# Patient Record
Sex: FEMALE | Race: WHITE | NOT HISPANIC OR LATINO | Employment: FULL TIME | URBAN - METROPOLITAN AREA
[De-identification: names, ages, dates, MRNs, and addresses within clinical notes are randomized per-mention and may not be internally consistent; named-entity substitution may affect disease eponyms.]

---

## 2019-03-15 ENCOUNTER — ROUTINE PRENATAL (OUTPATIENT)
Dept: OBSTETRICS AND GYNECOLOGY | Facility: CLINIC | Age: 34
End: 2019-03-15
Payer: MEDICAID

## 2019-03-15 VITALS
DIASTOLIC BLOOD PRESSURE: 77 MMHG | SYSTOLIC BLOOD PRESSURE: 115 MMHG | HEART RATE: 83 BPM | HEIGHT: 63 IN | BODY MASS INDEX: 35.97 KG/M2 | WEIGHT: 203 LBS

## 2019-03-15 DIAGNOSIS — Z98.891 HISTORY OF CESAREAN SECTION: ICD-10-CM

## 2019-03-15 LAB
ABS NRBC COUNT: 0 X 10 3/UL (ref 0–0.01)
ABSOLUTE BASOPHIL: 0.04 X 10 3/UL (ref 0–0.22)
ABSOLUTE EOSINOPHIL: 0.03 X 10 3/UL (ref 0.04–0.54)
ABSOLUTE IMMATURE GRAN: 0.1 X 10 3/UL (ref 0–0.04)
ABSOLUTE LYMPHOCYTE: 1.51 X 10 3/UL (ref 0.86–4.75)
ABSOLUTE MONOCYTE: 0.55 X 10 3/UL (ref 0.22–1.08)
AMORPH URATE CRY URNS QL MICRO: NEGATIVE
ANTIBODY SCREEN: NEGATIVE
BACTERIA #/AREA URNS HPF: ABNORMAL /[HPF]
BASOPHILS NFR BLD: 0.4 %
BILIRUB UR QL STRIP: NEGATIVE
CLARITY UR: ABNORMAL
COLOR UR: YELLOW
EOSINOPHIL NFR BLD: 0.3 %
EPITHELIAL CELLS: ABNORMAL
GLUCOSE (UA): NEGATIVE MG/DL
GLUCOSE 1 HR POST 50 GM: 132 MG/DL
HCT VFR BLD AUTO: 36.7 % (ref 37–47)
HGB BLD-MCNC: 11.4 G/DL (ref 12–16)
IMMATURE GRANULOCYTES: 0.9 % (ref 0–0.5)
KETONES UR QL STRIP: NEGATIVE MG/DL
LEUKOCYTE ESTERASE UR QL STRIP: ABNORMAL
LYMPHOCYTES NFR BLD: 13.4 %
MCH RBC QN AUTO: 29.4 PG (ref 27–32)
MCHC RBC AUTO-ENTMCNC: 31.1 G/DL (ref 32–36)
MCV RBC AUTO: 94.6 FL (ref 82–100)
MONOCYTES NFR BLD: 4.9 %
MUCOUS THREADS URNS QL MICRO: ABNORMAL
NEUTROPHILS ABSOLUTE COUNT: 9.04 X 10 3/UL (ref 2.15–7.56)
NEUTROPHILS NFR BLD: 80.1 %
NITRITE UR QL STRIP: NEGATIVE
NUCLEATED RED BLOOD CELLS: 0 /100 WBC (ref 0–0.2)
OCCULT BLOOD: ABNORMAL
PH, URINE: 7 (ref 5–7.5)
PLATELET # BLD AUTO: 219 X 10 3/UL (ref 135–400)
PROT UR QL STRIP: NEGATIVE MG/DL
RBC # BLD AUTO: 3.88 X 10 6/UL (ref 4.2–5.4)
RBC/HPF: ABNORMAL
RDW-SD: 45.8 FL (ref 37–54)
SP GR UR STRIP: 1.01 (ref 1–1.03)
SYPHILIS TREPONEMAL ANTIBODY: NONREACTIVE
URINE CULTURE, ROUTINE: NORMAL
UROBILINOGEN, URINE: NORMAL E.U./DL (ref 0–1)
WBC # BLD: 11.27 X 10 3/UL (ref 4.3–10.8)
WBC/HPF: ABNORMAL

## 2019-03-15 PROCEDURE — 99213 OFFICE O/P EST LOW 20 MIN: CPT | Mod: TH,S$GLB,, | Performed by: OBSTETRICS & GYNECOLOGY

## 2019-03-15 PROCEDURE — 99213 PR OFFICE/OUTPT VISIT, EST, LEVL III, 20-29 MIN: ICD-10-PCS | Mod: TH,S$GLB,, | Performed by: OBSTETRICS & GYNECOLOGY

## 2019-03-15 NOTE — PROGRESS NOTES
Subjective:       Patient ID: Vashti Mccormack is a 33 y.o.  at 35w6d     Chief Complaint:  Routine Prenatal Visit      History of Present Illness  No complaints. Labs and history reviewed with pt.         Review of Systems  Denies n/v, f/c, dysuria, contractions,   VD, VB, round ligament pain, headaches, preE ROS       Objective:     Vitals:    03/15/19 1034   BP: 115/77   Pulse: 83     Wt Readings from Last 3 Encounters:   03/15/19 92.1 kg (203 lb)       nad  NCAT  pupils normal size  Skin nml no rashes or lesions  No resp distress, resp even and unlabored  Gravid nt, no rebound no guarding  No cyanosis or clubbing, edema appropriate for pregn  FH 36cm   FHT: 150s   CVX: cl/th/high     Assessment:        1. History of  section                Plan:         GBS today   Encouraged PNV  Pain, fever, bleeding precautions   RTC 1 weeks

## 2019-03-17 LAB
GROUP B STREP MOLECULAR: NEGATIVE
PENICILLIN ALLERGIC: NO

## 2019-03-21 ENCOUNTER — ROUTINE PRENATAL (OUTPATIENT)
Dept: OBSTETRICS AND GYNECOLOGY | Facility: CLINIC | Age: 34
End: 2019-03-21
Payer: MEDICAID

## 2019-03-21 VITALS
DIASTOLIC BLOOD PRESSURE: 73 MMHG | WEIGHT: 207 LBS | SYSTOLIC BLOOD PRESSURE: 111 MMHG | HEART RATE: 80 BPM | BODY MASS INDEX: 36.67 KG/M2

## 2019-03-21 DIAGNOSIS — Z98.891 HISTORY OF CESAREAN SECTION: ICD-10-CM

## 2019-03-21 LAB — PRENATAL STREP B CULTURE: NORMAL

## 2019-03-21 PROCEDURE — 99213 OFFICE O/P EST LOW 20 MIN: CPT | Mod: TH,S$GLB,, | Performed by: OBSTETRICS & GYNECOLOGY

## 2019-03-21 PROCEDURE — 99213 PR OFFICE/OUTPT VISIT, EST, LEVL III, 20-29 MIN: ICD-10-PCS | Mod: TH,S$GLB,, | Performed by: OBSTETRICS & GYNECOLOGY

## 2019-03-27 ENCOUNTER — ROUTINE PRENATAL (OUTPATIENT)
Dept: OBSTETRICS AND GYNECOLOGY | Facility: CLINIC | Age: 34
End: 2019-03-27
Payer: MEDICAID

## 2019-03-27 VITALS
BODY MASS INDEX: 36.49 KG/M2 | SYSTOLIC BLOOD PRESSURE: 109 MMHG | HEART RATE: 87 BPM | WEIGHT: 206 LBS | DIASTOLIC BLOOD PRESSURE: 74 MMHG

## 2019-03-27 DIAGNOSIS — Z98.891 HISTORY OF CESAREAN SECTION: ICD-10-CM

## 2019-03-27 PROCEDURE — 99213 OFFICE O/P EST LOW 20 MIN: CPT | Mod: TH,S$GLB,, | Performed by: OBSTETRICS & GYNECOLOGY

## 2019-03-27 PROCEDURE — 99213 PR OFFICE/OUTPT VISIT, EST, LEVL III, 20-29 MIN: ICD-10-PCS | Mod: TH,S$GLB,, | Performed by: OBSTETRICS & GYNECOLOGY

## 2019-03-27 NOTE — PROGRESS NOTES
Subjective:       Patient ID: Vashti Mccormack is a 33 y.o.  at 37w4d     Chief Complaint:  F/u ob     History of Present Illness  No complaints. Labs and history reviewed with pt.         Review of Systems  Denies n/v, f/c, dysuria, contractions,   VD, VB, round ligament pain, headaches, preE ROS       Objective:     Vitals:    19 1456   BP: 109/74   Pulse: 87     Wt Readings from Last 3 Encounters:   19 93.4 kg (206 lb)   19 93.9 kg (207 lb)   03/15/19 92.1 kg (203 lb)       nad  NCAT  pupils normal size  Skin nml no rashes or lesions  No resp distress, resp even and unlabored  Gravid nt, no rebound no guarding  No cyanosis or clubbing, edema appropriate for pregn  FH 37cm   FHT: 150s       Assessment:        1. History of  section                Plan:         GBS neg  Section on 19 at noon   Encouraged PNV  Pain, fever, bleeding precautions   RTC 1 weeks

## 2019-04-03 ENCOUNTER — ROUTINE PRENATAL (OUTPATIENT)
Dept: OBSTETRICS AND GYNECOLOGY | Facility: CLINIC | Age: 34
End: 2019-04-03
Payer: MEDICAID

## 2019-04-03 VITALS
SYSTOLIC BLOOD PRESSURE: 114 MMHG | BODY MASS INDEX: 36.49 KG/M2 | WEIGHT: 206 LBS | DIASTOLIC BLOOD PRESSURE: 72 MMHG | HEART RATE: 81 BPM

## 2019-04-03 DIAGNOSIS — Z98.891 HISTORY OF CESAREAN SECTION: ICD-10-CM

## 2019-04-03 DIAGNOSIS — Z34.83 ENCOUNTER FOR SUPERVISION OF OTHER NORMAL PREGNANCY IN THIRD TRIMESTER: Primary | ICD-10-CM

## 2019-04-03 PROCEDURE — 99213 PR OFFICE/OUTPT VISIT, EST, LEVL III, 20-29 MIN: ICD-10-PCS | Mod: TH,S$GLB,, | Performed by: OBSTETRICS & GYNECOLOGY

## 2019-04-03 PROCEDURE — 99213 OFFICE O/P EST LOW 20 MIN: CPT | Mod: TH,S$GLB,, | Performed by: OBSTETRICS & GYNECOLOGY

## 2019-04-03 NOTE — PROGRESS NOTES
Subjective:       Patient ID: Vashti Mccormack is a 33 y.o.  at 38w4d     Chief Complaint:  F/u ob     History of Present Illness  No complaints. Labs and history reviewed with pt.         Review of Systems  Denies n/v, f/c, dysuria, contractions,   VD, VB, round ligament pain, headaches, preE ROS       Objective:     Vitals:    19 1310   BP: 114/72   Pulse: 81     Wt Readings from Last 3 Encounters:   19 93.4 kg (206 lb)   19 93.4 kg (206 lb)   19 93.9 kg (207 lb)       nad  NCAT  pupils normal size  Skin nml no rashes or lesions  No resp distress, resp even and unlabored  Gravid nt, no rebound no guarding  No cyanosis or clubbing, edema appropriate for pregn  FH 37cm   FHT: 150s       Assessment:        1. Encounter for supervision of other normal pregnancy in third trimester    2. History of  section                Plan:         GBS neg  Section on 4/10/19 at noon   Encouraged PNV  Pain, fever, bleeding precautions

## 2019-04-10 ENCOUNTER — OUTSIDE PLACE OF SERVICE (OUTPATIENT)
Dept: OBSTETRICS AND GYNECOLOGY | Facility: CLINIC | Age: 34
End: 2019-04-10
Payer: MEDICAID

## 2019-04-10 PROCEDURE — 59514 CESAREAN DELIVERY ONLY: CPT | Mod: GB,,, | Performed by: OBSTETRICS & GYNECOLOGY

## 2019-04-10 PROCEDURE — 59514 PR CESAREAN DELIVERY ONLY: ICD-10-PCS | Mod: GB,,, | Performed by: OBSTETRICS & GYNECOLOGY

## 2019-04-11 PROCEDURE — 99231 SBSQ HOSP IP/OBS SF/LOW 25: CPT | Mod: ,,, | Performed by: OBSTETRICS & GYNECOLOGY

## 2019-04-11 PROCEDURE — 99231 PR SUBSEQUENT HOSPITAL CARE,LEVL I: ICD-10-PCS | Mod: ,,, | Performed by: OBSTETRICS & GYNECOLOGY

## 2019-04-12 PROCEDURE — 99231 SBSQ HOSP IP/OBS SF/LOW 25: CPT | Mod: ,,, | Performed by: OBSTETRICS & GYNECOLOGY

## 2019-04-12 PROCEDURE — 99231 PR SUBSEQUENT HOSPITAL CARE,LEVL I: ICD-10-PCS | Mod: ,,, | Performed by: OBSTETRICS & GYNECOLOGY

## 2019-04-13 PROCEDURE — 99238 HOSP IP/OBS DSCHRG MGMT 30/<: CPT | Mod: ,,, | Performed by: OBSTETRICS & GYNECOLOGY

## 2019-04-13 PROCEDURE — 99238 PR HOSPITAL DISCHARGE DAY,<30 MIN: ICD-10-PCS | Mod: ,,, | Performed by: OBSTETRICS & GYNECOLOGY

## 2019-04-25 ENCOUNTER — POSTPARTUM VISIT (OUTPATIENT)
Dept: OBSTETRICS AND GYNECOLOGY | Facility: CLINIC | Age: 34
End: 2019-04-25
Payer: MEDICAID

## 2019-04-25 VITALS
HEART RATE: 93 BPM | WEIGHT: 190 LBS | HEIGHT: 63 IN | DIASTOLIC BLOOD PRESSURE: 70 MMHG | SYSTOLIC BLOOD PRESSURE: 92 MMHG | BODY MASS INDEX: 33.66 KG/M2

## 2019-04-25 DIAGNOSIS — Z98.891 S/P CESAREAN SECTION: ICD-10-CM

## 2019-04-25 PROCEDURE — 99024 POSTOP FOLLOW-UP VISIT: CPT | Mod: S$GLB,,, | Performed by: OBSTETRICS & GYNECOLOGY

## 2019-04-25 PROCEDURE — 99024 PR POST-OP FOLLOW-UP VISIT: ICD-10-PCS | Mod: S$GLB,,, | Performed by: OBSTETRICS & GYNECOLOGY

## 2019-04-25 NOTE — PROGRESS NOTES
Subjective:       Patient ID: Vashti Mccormack 33 y.o.     Chief Complaint:  No chief complaint on file.      History of Present Illness  Pt here for wound check/staple removal.  Pt doing well with normal postop complaints. Does have a hemorrhoid that has been present since delivery         OB History   No data available       Review of Systems  Review of Systems   Constitutional: Negative for activity change, appetite change, chills, diaphoresis and fever.   Eyes: Negative for visual disturbance.   Respiratory: Negative for shortness of breath and wheezing.    Cardiovascular: Negative for chest pain.   Gastrointestinal: Negative for blood in stool, constipation, diarrhea, nausea and vomiting.   Genitourinary: Negative for dysuria, hematuria and menorrhagia.   Neurological: Negative for headaches.   Psychiatric/Behavioral: Negative for depression. The patient is not nervous/anxious.    Breast: Negative for lump, skin changes and tenderness          Objective:    Physical Exam:   Constitutional: She appears well-developed and well-nourished. No distress.    HENT:   Head: Normocephalic and atraumatic.      Cardiovascular: Exam reveals no clubbing and no cyanosis.     Pulmonary/Chest: Effort normal. No respiratory distress.        Abdominal: Soft. She exhibits abdominal incision (clean dry and intact). She exhibits no distension. There is no rebound and no guarding.         Rectal: 1 cm external hemorrhoid not thrombosed      Musculoskeletal: Normal range of motion and moves all extremeties.        Skin: Skin is warm and dry. She is not diaphoretic. No cyanosis or erythema. Nails show no clubbing.    Psychiatric: She has a normal mood and affect. Her behavior is normal.          Assessment:     SP csection 2 weeks  Wound check normal           Plan:    encouraged preparation H, dermaplast, and tucks pad   Keep wound clean and dry  Pain bleeding fever precautions given  rtc for 6 wk exam

## 2019-05-23 ENCOUNTER — POSTPARTUM VISIT (OUTPATIENT)
Dept: OBSTETRICS AND GYNECOLOGY | Facility: CLINIC | Age: 34
End: 2019-05-23
Payer: MEDICAID

## 2019-05-23 VITALS
HEART RATE: 74 BPM | HEIGHT: 63 IN | SYSTOLIC BLOOD PRESSURE: 110 MMHG | WEIGHT: 189 LBS | DIASTOLIC BLOOD PRESSURE: 74 MMHG | BODY MASS INDEX: 33.49 KG/M2

## 2019-05-23 DIAGNOSIS — Z98.891 HISTORY OF CESAREAN SECTION: ICD-10-CM

## 2019-05-23 DIAGNOSIS — Z98.891 S/P CESAREAN SECTION: Primary | ICD-10-CM

## 2019-05-23 DIAGNOSIS — B37.2 SKIN CANDIDIASIS: ICD-10-CM

## 2019-05-23 PROCEDURE — 0503F PR POSTPARTUM CARE VISIT: ICD-10-PCS | Mod: S$GLB,,, | Performed by: OBSTETRICS & GYNECOLOGY

## 2019-05-23 PROCEDURE — 0503F POSTPARTUM CARE VISIT: CPT | Mod: S$GLB,,, | Performed by: OBSTETRICS & GYNECOLOGY

## 2019-05-23 RX ORDER — NYSTATIN 100000 [USP'U]/G
POWDER TOPICAL
Qty: 60 G | Refills: 0 | Status: SHIPPED | OUTPATIENT
Start: 2019-05-23 | End: 2021-03-01

## 2019-05-23 NOTE — PROGRESS NOTES
Subjective:       Patient ID: Vashti Mccormack is a 33 y.o. female.    Chief Complaint:  Postpartum Care      History of Present Illness  Here for 6 wk pp exam sp    Complaints pain and a rash over her c section incision. Started 3 weeks ago. + odor but no drainage.     Review of Systems  Review of Systems   Constitutional: Negative for activity change, appetite change, chills, diaphoresis and fever.   Eyes: Negative for visual disturbance.   Respiratory: Negative for shortness of breath and wheezing.    Cardiovascular: Negative for chest pain.   Gastrointestinal: Negative for blood in stool, constipation, diarrhea, nausea and vomiting.   Genitourinary: Negative for dysuria, hematuria and menorrhagia.   Integumentary:  Negative for breast skin changes and breast tenderness.   Neurological: Negative for headaches.   Psychiatric/Behavioral: Negative for depression. The patient is not nervous/anxious.    Breast: Negative for lump, skin changes and tenderness          Objective:    Physical Exam:   Constitutional: She is oriented to person, place, and time. She appears well-developed and well-nourished.    HENT:   Head: Normocephalic and atraumatic.    Eyes: Pupils are equal, round, and reactive to light. Conjunctivae are normal.    Neck: Normal range of motion. No thyromegaly present.    Cardiovascular: Exam reveals no clubbing, no cyanosis and no edema.          Abdominal: Soft. She exhibits no distension. Hernia confirmed negative in the right inguinal area and confirmed negative in the left inguinal area.     Genitourinary: Vagina normal and uterus normal. Pelvic exam was performed with patient supine. There is no rash, tenderness, lesion or injury on the right labia. There is no rash, tenderness, lesion or injury on the left labia. Cervix is normal. Right adnexum displays no mass, no tenderness and no fullness. Left adnexum displays no mass, no tenderness and no fullness. No vaginal discharge found. Cervix  exhibits no discharge.           Musculoskeletal: Normal range of motion and moves all extremeties.      Lymphadenopathy:        Right: No inguinal adenopathy present.        Left: No inguinal adenopathy present.    Neurological: She is alert and oriented to person, place, and time.    Skin: Skin is warm and dry. No cyanosis. Nails show no clubbing.    Psychiatric: She has a normal mood and affect. Her behavior is normal.          Assessment:     Postpartum  Depression screen nml  bottle feeding     Plan:   rtc for annual or prn  Contraception nexplanon   Nystatin powder given   Preventative screening utd  RTC for nexplanon placement

## 2019-05-28 ENCOUNTER — OFFICE VISIT (OUTPATIENT)
Dept: OBSTETRICS AND GYNECOLOGY | Facility: CLINIC | Age: 34
End: 2019-05-28
Payer: MEDICAID

## 2019-05-28 VITALS
DIASTOLIC BLOOD PRESSURE: 75 MMHG | WEIGHT: 193 LBS | HEIGHT: 63 IN | SYSTOLIC BLOOD PRESSURE: 115 MMHG | BODY MASS INDEX: 34.2 KG/M2 | HEART RATE: 79 BPM

## 2019-05-28 DIAGNOSIS — Z30.017 ENCOUNTER FOR INITIAL PRESCRIPTION OF IMPLANTABLE SUBDERMAL CONTRACEPTIVE: Primary | ICD-10-CM

## 2019-05-28 PROCEDURE — 99499 NO LOS: ICD-10-PCS | Mod: S$GLB,,, | Performed by: OBSTETRICS & GYNECOLOGY

## 2019-05-28 PROCEDURE — 99499 UNLISTED E&M SERVICE: CPT | Mod: S$GLB,,, | Performed by: OBSTETRICS & GYNECOLOGY

## 2019-05-30 PROCEDURE — 11981 INSERTION DRUG DLVR IMPLANT: CPT | Mod: S$GLB,,, | Performed by: OBSTETRICS & GYNECOLOGY

## 2019-05-30 PROCEDURE — 11981 INSERTION OF NEXPLANON: ICD-10-PCS | Mod: S$GLB,,, | Performed by: OBSTETRICS & GYNECOLOGY

## 2019-05-30 NOTE — PROCEDURES
Insertion of Nexplanon  Date/Time: 5/30/2019 9:17 AM  Performed by: Jaya Gan MD  Authorized by: Jaya Gan MD     Consent obtained:  Written  Patient questions answered: yes    Patient agrees, verbalizes understanding, and wants to proceed: yes    Instructions and paperwork completed: yes    Pre-procedure timeout performed: yes    Local anesthetic:  Lidocaine with epinephrine  The site was cleaned and prepped in a sterile fashion: yes    Left/right:  Left   68 mg etonogestrel 68 mg  Preloaded Implanon trocar was placed subdermally: yes    Visualization of implant was obtained: yes    Nexplanon was inserted and trocar removed: yes    Visualization of notch in stilette and palpitation of device: yes    Palpitation confirms placement by provider and patient: yes    Site was closed with steri-strips and pressure bandage applied: yes

## 2020-02-19 ENCOUNTER — OFFICE VISIT (OUTPATIENT)
Dept: PRIMARY CARE CLINIC | Facility: CLINIC | Age: 35
End: 2020-02-19
Payer: MEDICAID

## 2020-02-19 VITALS
WEIGHT: 190.19 LBS | HEART RATE: 75 BPM | HEIGHT: 63 IN | BODY MASS INDEX: 33.7 KG/M2 | DIASTOLIC BLOOD PRESSURE: 70 MMHG | SYSTOLIC BLOOD PRESSURE: 120 MMHG | OXYGEN SATURATION: 98 %

## 2020-02-19 DIAGNOSIS — R22.2 MASS ON BACK: ICD-10-CM

## 2020-02-19 DIAGNOSIS — L72.0 EPIDERMAL CYST OF NECK: ICD-10-CM

## 2020-02-19 DIAGNOSIS — L98.9 SKIN LESION: ICD-10-CM

## 2020-02-19 DIAGNOSIS — Z00.00 PREVENTATIVE HEALTH CARE: Primary | ICD-10-CM

## 2020-02-19 PROCEDURE — 99204 OFFICE O/P NEW MOD 45 MIN: CPT | Mod: 25,S$GLB,, | Performed by: FAMILY MEDICINE

## 2020-02-19 PROCEDURE — 11401 EXC TR-EXT B9+MARG 0.6-1 CM: CPT | Mod: S$GLB,,, | Performed by: FAMILY MEDICINE

## 2020-02-19 PROCEDURE — 11401 PR EXC SKIN BENIG 0.6-1 CM TRUNK,ARM,LEG: ICD-10-PCS | Mod: S$GLB,,, | Performed by: FAMILY MEDICINE

## 2020-02-19 PROCEDURE — 99204 PR OFFICE/OUTPT VISIT, NEW, LEVL IV, 45-59 MIN: ICD-10-PCS | Mod: 25,S$GLB,, | Performed by: FAMILY MEDICINE

## 2020-02-19 RX ORDER — PHENTERMINE HYDROCHLORIDE 37.5 MG/1
37.5 TABLET ORAL
COMMUNITY
End: 2021-03-01

## 2020-02-19 RX ORDER — LIDOCAINE HYDROCHLORIDE AND EPINEPHRINE 10; 10 MG/ML; UG/ML
2 INJECTION, SOLUTION INFILTRATION; PERINEURAL
Status: COMPLETED | OUTPATIENT
Start: 2020-02-19 | End: 2020-02-19

## 2020-02-19 RX ADMIN — LIDOCAINE HYDROCHLORIDE AND EPINEPHRINE 2 ML: 10; 10 INJECTION, SOLUTION INFILTRATION; PERINEURAL at 01:02

## 2020-02-19 NOTE — PROGRESS NOTES
"Subjective:       Patient ID: Vashti Mccormack is a 34 y.o. female.    Chief Complaint: Establish Care; Cyst (no pain; has had for a while; hard to touch; has gotten larger; ); and Mass (Pt has a lump to the left side of her lower back. )    HPI     Here to est care. Multiple complaints as below    Pt reports "mass" on lower back, nontender but she does notice it when she lifts heavy objects. Mass is on L lower back close to spine. Denies numbness, weakness.     Reports mass of anterior neck, has been present for several months and pt concerned it is growing in size    Has skin lesion mid L back that she is concerned is growing in size, agreeable to removal and sending for path    Prev - utd pap and vacs. She is trying to lose weight. On adipex from another dr    Review of Systems   Constitutional: Negative for chills, diaphoresis, fatigue and fever.   HENT: Negative for congestion, rhinorrhea, sinus pressure, sore throat, trouble swallowing and voice change.    Eyes: Negative for pain and visual disturbance.   Respiratory: Negative for cough, chest tightness, shortness of breath and wheezing.    Cardiovascular: Negative for chest pain, palpitations and leg swelling.   Gastrointestinal: Negative for abdominal pain, constipation, diarrhea, nausea and vomiting.   Endocrine: Negative for polydipsia and polyuria.   Genitourinary: Negative for decreased urine volume, dysuria, frequency and hematuria.   Musculoskeletal: Negative for arthralgias, back pain and myalgias.   Skin: Negative for color change, pallor and rash.   Neurological: Negative for dizziness, syncope, weakness, light-headedness and headaches.   Hematological: Negative for adenopathy.   Psychiatric/Behavioral: Negative for dysphoric mood and sleep disturbance. The patient is not nervous/anxious.        Objective:      Physical Exam   Constitutional: She is oriented to person, place, and time. She appears well-developed and well-nourished. No distress. "   HENT:   Head: Normocephalic and atraumatic.   Nose: Nose normal.   Mouth/Throat: Oropharynx is clear and moist.   Eyes: Conjunctivae are normal.   Neck: Normal range of motion. Neck supple. No JVD present. No thyromegaly present.   superficial cyst anterior neck   Cardiovascular: Normal rate, regular rhythm, normal heart sounds and intact distal pulses. Exam reveals no gallop and no friction rub.   No murmur heard.  Pulmonary/Chest: Effort normal and breath sounds normal. No stridor. No respiratory distress. She has no wheezes. She has no rales.   Abdominal: Soft. Bowel sounds are normal. She exhibits no distension. There is no tenderness.   Musculoskeletal: She exhibits no edema or tenderness.   Deep mass L lower back - possible lipoma   Lymphadenopathy:     She has no cervical adenopathy.   Neurological: She is alert and oriented to person, place, and time.   Skin: Skin is warm and dry. No rash noted. She is not diaphoretic. No erythema. No pallor.   Hyperpigmented 2 cm pedunculated skin lesion mid left back   Psychiatric: She has a normal mood and affect. Her behavior is normal.   Vitals reviewed.      Assessment:       1. Preventative health care    2. Epidermal cyst of neck    3. Mass on back    4. Skin lesion    5. BMI 33.0-33.9,adult        Plan:       Vashti was seen today for establish care, cyst and mass.    Diagnoses and all orders for this visit:    Preventative health care  Comments:  utd pap and vacs    Epidermal cyst of neck  -     Ambulatory referral/consult to General Surgery; Future    Mass on back  -     US Extremity Non Vascular Complete Left; Future  -     US Extremity Non Vascular Complete Left    Skin lesion  Comments:  see procedure note  Orders:  -     Specimen to Pathology; Future  -     Specimen to Pathology  -     Specimen to Pathology; Future  -     Specimen to Pathology  -     lidocaine-EPINEPHrine 1%-1:100,000 injection 2 mL    BMI 33.0-33.9,adult  Comments:  counseled on weight loss  plan and goal

## 2020-02-20 NOTE — PROGRESS NOTES
PROCEDURE: skin lesion excision    ANESTHETIC: 3 cc 1% Lidocaine with Epinephrine 1:100,000    prep: chloroprep    physician: Tete Hernandez MD    PREOPERATIVE DIAGNOSIS: suspicious skin lesion    POSTOPERATIVE DIAGNOSIS: same    PATHOLOGIC DIAGNOSIS: Pending    LOCATION: L mid back    INITIAL LESION SIZE: 2 cm pedunculated lesion with narrow base    EXCISED DIAMETER: 1 cm    PREPARATION:  The diagnosis, procedure, alternatives, benefits and risks, including but not limited to: infection, bleeding/bruising, drug reactions, pain, scar or cosmetic defect, local sensation disturbances, wound dehiscence (separation of wound edges after sutures removed) and/or recurrence of present condition were explained to the patient. The patient elected to proceed.  Patient's identity was verified and the site was verified.    The lesion and surrounding area was given a sterile prep using chlorhexidine and draped in the usual sterile fashion. The skin was then stretched perpendicular to the skin tension lines and the lesion removed using 15 blade scalpel. Hemostasis obtained with pressure. Antibiotic ointment and a sterile dressing applied. The specimen was sent for pathologic examination. The patient tolerated the procedure well.    EBL: <5 ml    Condition:  Stable    Complications:  none.    Plan:  1. Instructed to keep the wound dry and covered for 24-48h and clean thereafter.  2. Warning signs of infection were reviewed.    3. Recommended that the patient use OTC acetaminophen as needed for pain.     The patient tolerated the procedure well.    The area was cleaned and dressed appropriately and the patient was given wound care instructions, as well as an appointment for follow-up evaluation.

## 2020-02-24 ENCOUNTER — OFFICE VISIT (OUTPATIENT)
Dept: SURGERY | Facility: CLINIC | Age: 35
End: 2020-02-24
Payer: MEDICAID

## 2020-02-24 VITALS — WEIGHT: 191.63 LBS | HEIGHT: 63 IN | BODY MASS INDEX: 33.95 KG/M2

## 2020-02-24 DIAGNOSIS — L72.0 EPIDERMAL CYST OF NECK: ICD-10-CM

## 2020-02-24 DIAGNOSIS — L72.3 SEBACEOUS CYST: Primary | ICD-10-CM

## 2020-02-24 DIAGNOSIS — R22.2 MASS ON BACK: Primary | ICD-10-CM

## 2020-02-24 PROCEDURE — 11421 EXC H-F-NK-SP B9+MARG 0.6-1: CPT | Mod: S$GLB,,, | Performed by: SURGERY

## 2020-02-24 PROCEDURE — 11421: ICD-10-PCS | Mod: S$GLB,,, | Performed by: SURGERY

## 2020-02-24 PROCEDURE — 99499 NO LOS: ICD-10-PCS | Mod: S$GLB,,, | Performed by: SURGERY

## 2020-02-24 PROCEDURE — 99499 UNLISTED E&M SERVICE: CPT | Mod: S$GLB,,, | Performed by: SURGERY

## 2020-02-24 RX ORDER — PHENTERMINE HYDROCHLORIDE 37.5 MG/1
37.5 TABLET ORAL
COMMUNITY
End: 2023-03-08

## 2020-02-24 NOTE — PROGRESS NOTES
HPI:  32-year-old female small subcutaneous nodule left mid neck region been present for a couple years, desires removal    PHYSICAL EXAM:  1 cm superficial subcutaneous nodule on the skin consistent with sebaceous cyst.  No redness or drainage  ASSESSMENT:    Sebaceous cyst left neck anteriorly  PLAN:     Excision today in office under local anesthesia.  See procedure note

## 2020-02-24 NOTE — LETTER
February 24, 2020      Tete Hernandez MD  [address]  [address]  [place] [address]           [place] [facility]  [address] [facility] [address]  Phone: [phone]  Fax: [phone]          Patient: Vashti Mccormack   MR Number: [id number]   YOB: 1985   Date of Visit: 2/24/2020       Dear Dr. Tete Hernandez:    Thank you for referring Vashti Mccormack to me for evaluation. Attached you will find relevant portions of my assessment and plan of care.    If you have questions, please do not hesitate to call me. I look forward to following Vashti Mccormack along with you.    Sincerely,    Elbert Parmar MD    Enclosure  CC:  No Recipients    If you would like to receive this communication electronically, please contact [email or web address] or [phone] to request more information on [employer] Link access.    For providers and/or their staff who would like to refer a patient to [facility], please contact us through our one-stop-shop provider referral line, [facility], at [phone].    If you feel you have received this communication in error or would no longer like to receive these types of communications, please e-mail [email or web address]

## 2020-02-24 NOTE — PROCEDURES
"Excision sebaceous cyst left neck  Date/Time: 2/24/2020 2:45 PM  Performed by: Elbert Parmar MD  Authorized by: Elbert Parmar MD     Consent Done?:  Yes (Verbal)  Time out: Immediately prior to procedure a "time out" was called to verify the correct patient, procedure, equipment, support staff and site/side marked as required.      STAFF:  Assistants?: No    INDICATIONS:cyst  LOCATION:  Body area:  Neck / anterior thoraxleft    PREP:Position:  Supine    ANESTHESIA:  Anesthesia:  Local infiltration  Local anesthetic:  Lidocaine 1% with epinephrine    PROCEDURE DETAILS:  Excision type:  Skin  Malignancy:  Benign  Excision size (cm):  1  Scalpel size:  15  Incision type:  Elliptical  Specimens?: No      Hemostasis was obtained.  Wound closure:  Simple  Wound repair size (cm):  2.5  Sutures:  Other (comments)  Sterile dressings:  Other (comments)  Post-op diagnosis: Same as pre-op diagnosis.  Post-operative instructions were provided for the patient.   4.0 nylon. primapore dressing      "

## 2020-03-02 ENCOUNTER — OFFICE VISIT (OUTPATIENT)
Dept: SURGERY | Facility: CLINIC | Age: 35
End: 2020-03-02
Payer: MEDICAID

## 2020-03-02 DIAGNOSIS — Z98.890 POST-OPERATIVE STATE: Primary | ICD-10-CM

## 2020-03-02 PROCEDURE — 99024 POSTOP FOLLOW-UP VISIT: CPT | Mod: S$GLB,,, | Performed by: SURGERY

## 2020-03-02 PROCEDURE — 99024 PR POST-OP FOLLOW-UP VISIT: ICD-10-PCS | Mod: S$GLB,,, | Performed by: SURGERY

## 2020-03-02 NOTE — PROGRESS NOTES
HPI:  Status post excision sebaceous cyst left anterior neck here for suture removed    PHYSICAL EXAM:  Three sutures removed.  Incision healing well  ASSESSMENT:    Status post excision sebaceous cyst anterior neck  PLAN:     Revisit as needed

## 2020-12-22 ENCOUNTER — OFFICE VISIT (OUTPATIENT)
Dept: OBSTETRICS AND GYNECOLOGY | Facility: CLINIC | Age: 35
End: 2020-12-22
Payer: MEDICAID

## 2020-12-22 VITALS
SYSTOLIC BLOOD PRESSURE: 124 MMHG | BODY MASS INDEX: 32.92 KG/M2 | HEIGHT: 63 IN | DIASTOLIC BLOOD PRESSURE: 85 MMHG | HEART RATE: 91 BPM | WEIGHT: 185.81 LBS

## 2020-12-22 DIAGNOSIS — Z97.5 BREAKTHROUGH BLEEDING ON NEXPLANON: ICD-10-CM

## 2020-12-22 DIAGNOSIS — N92.1 BREAKTHROUGH BLEEDING ON NEXPLANON: ICD-10-CM

## 2020-12-22 DIAGNOSIS — Z01.419 WOMEN'S ANNUAL ROUTINE GYNECOLOGICAL EXAMINATION: Primary | ICD-10-CM

## 2020-12-22 PROCEDURE — 99395 PR PREVENTIVE VISIT,EST,18-39: ICD-10-PCS | Mod: S$GLB,,, | Performed by: OBSTETRICS & GYNECOLOGY

## 2020-12-22 PROCEDURE — 99395 PREV VISIT EST AGE 18-39: CPT | Mod: S$GLB,,, | Performed by: OBSTETRICS & GYNECOLOGY

## 2020-12-22 NOTE — PROGRESS NOTES
"  Subjective:       Patient ID: Vashti Mccormack is a 35 y.o. female.    Chief Complaint:  Annual Exam      History of Present Illness  Pt here for gyn annual.  History and past labs reviewed with patient.    Complaints irregular bleeding and spotting.       Review of Systems  Review of Systems   Constitutional: Negative for chills and fever.   Respiratory: Negative for shortness of breath.    Cardiovascular: Negative for chest pain.   Gastrointestinal: Negative for abdominal pain, blood in stool, constipation, diarrhea, nausea, vomiting and reflux.   Genitourinary: Positive for menstrual problem. Negative for dysmenorrhea, dyspareunia, dysuria, hematuria, hot flashes, menorrhagia, pelvic pain, vaginal bleeding, vaginal discharge, postcoital bleeding and vaginal dryness.   Musculoskeletal: Negative for arthralgias and joint swelling.   Integumentary:  Negative for rash, hair changes, breast mass, nipple discharge and breast skin changes.   Psychiatric/Behavioral: Negative for depression. The patient is not nervous/anxious.    Breast: Negative for asymmetry, lump, mass, nipple discharge and skin changes          Objective:     Vitals:    12/22/20 1412   BP: 124/85   Pulse: 91   Weight: 84.3 kg (185 lb 12.8 oz)   Height: 5' 3" (1.6 m)       Physical Exam:   Constitutional: She appears well-developed and well-nourished. No distress.    HENT:   Head: Normocephalic and atraumatic.    Eyes: Conjunctivae and EOM are normal.    Neck: No tracheal deviation present. No thyromegaly present.    Cardiovascular: Exam reveals no clubbing, no cyanosis and no edema.     Pulmonary/Chest: Effort normal. No respiratory distress.        Abdominal: Soft. She exhibits no distension and no mass. There is no abdominal tenderness. There is no rebound and no guarding. No hernia.     Genitourinary:    Uterus and rectum normal.      Pelvic exam was performed with patient supine.   There is no rash, tenderness, lesion or injury on the right labia. " There is no rash, tenderness, lesion or injury on the left labia. Cervix is normal. Right adnexum displays no mass, no tenderness and no fullness. Left adnexum displays no mass, no tenderness and no fullness. There is bleeding in the vagina.                Skin: She is not diaphoretic. No cyanosis. Nails show no clubbing.         breast exam wnl- no nipple dc, skin changes, masses or lymph nodes palpated bilaterally    Assessment:        1. Women's annual routine gynecological examination    2. Breakthrough bleeding on Nexplanon                Plan:      Annual   Pap today   STD panel - declined   Flu shot declined   Discussed add back therapy for bleeding. Wants to wait right now.   RTC  1 year

## 2021-03-01 ENCOUNTER — OFFICE VISIT (OUTPATIENT)
Dept: FAMILY MEDICINE | Facility: CLINIC | Age: 36
End: 2021-03-01
Payer: MEDICAID

## 2021-03-01 VITALS
DIASTOLIC BLOOD PRESSURE: 67 MMHG | WEIGHT: 193.13 LBS | TEMPERATURE: 98 F | HEART RATE: 76 BPM | BODY MASS INDEX: 34.21 KG/M2 | OXYGEN SATURATION: 96 % | SYSTOLIC BLOOD PRESSURE: 139 MMHG

## 2021-03-01 DIAGNOSIS — G56.03 BILATERAL CARPAL TUNNEL SYNDROME: ICD-10-CM

## 2021-03-01 DIAGNOSIS — L30.9 DERMATITIS: ICD-10-CM

## 2021-03-01 DIAGNOSIS — L08.9 SKIN INFECTION: ICD-10-CM

## 2021-03-01 DIAGNOSIS — J34.2 DEVIATED SEPTUM: ICD-10-CM

## 2021-03-01 DIAGNOSIS — Z80.9 FAMILY HISTORY OF CANCER: ICD-10-CM

## 2021-03-01 DIAGNOSIS — Z00.00 PREVENTATIVE HEALTH CARE: Primary | ICD-10-CM

## 2021-03-01 DIAGNOSIS — F32.A DEPRESSION, UNSPECIFIED DEPRESSION TYPE: ICD-10-CM

## 2021-03-01 PROCEDURE — 99215 PR OFFICE/OUTPT VISIT, EST, LEVL V, 40-54 MIN: ICD-10-PCS | Mod: S$GLB,,, | Performed by: FAMILY MEDICINE

## 2021-03-01 PROCEDURE — 99215 OFFICE O/P EST HI 40 MIN: CPT | Mod: S$GLB,,, | Performed by: FAMILY MEDICINE

## 2021-03-01 RX ORDER — BUPROPION HYDROCHLORIDE 150 MG/1
150 TABLET ORAL DAILY
Qty: 30 TABLET | Refills: 2 | Status: SHIPPED | OUTPATIENT
Start: 2021-03-01 | End: 2022-06-08

## 2021-03-01 RX ORDER — DOXYCYCLINE 100 MG/1
100 CAPSULE ORAL 2 TIMES DAILY
Qty: 14 CAPSULE | Refills: 0 | Status: SHIPPED | OUTPATIENT
Start: 2021-03-01 | End: 2021-03-08

## 2021-03-01 RX ORDER — TRIAMCINOLONE ACETONIDE 1 MG/G
OINTMENT TOPICAL 2 TIMES DAILY
Qty: 15 G | Refills: 0 | Status: SHIPPED | OUTPATIENT
Start: 2021-03-01 | End: 2023-03-08

## 2021-03-05 ENCOUNTER — TELEPHONE (OUTPATIENT)
Dept: HEMATOLOGY/ONCOLOGY | Facility: CLINIC | Age: 36
End: 2021-03-05

## 2021-03-11 ENCOUNTER — PATIENT MESSAGE (OUTPATIENT)
Dept: FAMILY MEDICINE | Facility: CLINIC | Age: 36
End: 2021-03-11

## 2021-03-11 DIAGNOSIS — G56.03 BILATERAL CARPAL TUNNEL SYNDROME: Primary | ICD-10-CM

## 2021-03-25 ENCOUNTER — PATIENT MESSAGE (OUTPATIENT)
Dept: FAMILY MEDICINE | Facility: CLINIC | Age: 36
End: 2021-03-25

## 2021-03-25 DIAGNOSIS — G56.03 BILATERAL CARPAL TUNNEL SYNDROME: Primary | ICD-10-CM

## 2021-03-26 ENCOUNTER — OFFICE VISIT (OUTPATIENT)
Dept: FAMILY MEDICINE | Facility: CLINIC | Age: 36
End: 2021-03-26
Payer: MEDICAID

## 2021-03-26 VITALS
SYSTOLIC BLOOD PRESSURE: 138 MMHG | OXYGEN SATURATION: 98 % | TEMPERATURE: 98 F | HEART RATE: 73 BPM | DIASTOLIC BLOOD PRESSURE: 60 MMHG

## 2021-03-26 DIAGNOSIS — L08.9 SKIN INFECTION: ICD-10-CM

## 2021-03-26 DIAGNOSIS — M25.521 RIGHT ELBOW PAIN: Primary | ICD-10-CM

## 2021-03-26 DIAGNOSIS — J34.2 DEVIATED SEPTUM: ICD-10-CM

## 2021-03-26 LAB
ABS NRBC COUNT: 0 X 10 3/UL (ref 0–0.01)
ABSOLUTE BASOPHIL: 0.04 X 10 3/UL (ref 0–0.22)
ABSOLUTE EOSINOPHIL: 0.11 X 10 3/UL (ref 0.04–0.54)
ABSOLUTE IMMATURE GRAN: 0.01 X 10 3/UL (ref 0–0.04)
ABSOLUTE LYMPHOCYTE: 1.81 X 10 3/UL (ref 0.86–4.75)
ABSOLUTE MONOCYTE: 0.55 X 10 3/UL (ref 0.22–1.08)
ALBUMIN SERPL-MCNC: 4 G/DL (ref 3.5–5.2)
ALBUMIN/GLOB SERPL ELPH: 1.5 {RATIO} (ref 1–2.7)
ALP ISOS SERPL LEV INH-CCNC: 66 U/L (ref 35–105)
ALT (SGPT): 14 U/L (ref 0–33)
AMORPH URATE CRY URNS QL MICRO: NEGATIVE
ANION GAP SERPL CALC-SCNC: 5 MMOL/L (ref 8–17)
AST SERPL-CCNC: 15 U/L (ref 0–32)
BACTERIA #/AREA URNS HPF: ABNORMAL /[HPF]
BASOPHILS NFR BLD: 0.6 % (ref 0.2–1.2)
BILIRUB UR QL STRIP: NEGATIVE
BILIRUBIN, TOTAL: <0.15 MG/DL (ref 0–1.2)
BUN/CREAT SERPL: 18.2 (ref 6–20)
CALCIUM SERPL-MCNC: 9 MG/DL (ref 8.6–10.2)
CARBON DIOXIDE, CO2: 30 MMOL/L (ref 22–29)
CHLORIDE: 104 MMOL/L (ref 98–107)
CHOLEST SERPL-MSCNC: 210 MG/DL (ref 100–200)
CLARITY UR: CLEAR
COLOR UR: YELLOW
CREAT SERPL-MCNC: 0.67 MG/DL (ref 0.5–0.9)
EOSINOPHIL NFR BLD: 1.5 % (ref 0.7–7)
EPITHELIAL CELLS: ABNORMAL
ESTIMATED AVERAGE GLUCOSE: 102 MG/DL
GFR ESTIMATION: 101.37
GLOBULIN: 2.6 G/DL (ref 1.5–4.5)
GLUCOSE (UA): NEGATIVE MG/DL
GLUCOSE: 96 MG/DL (ref 74–106)
HBA1C MFR BLD: 5.2 % (ref 4–6)
HCT VFR BLD AUTO: 39.5 % (ref 37–47)
HDLC SERPL-MCNC: 71 MG/DL
HGB BLD-MCNC: 12.1 G/DL (ref 12–16)
IMMATURE GRANULOCYTES: 0.1 % (ref 0–0.5)
KETONES UR QL STRIP: NEGATIVE MG/DL
LDL/HDL RATIO: 1.8 (ref 1–3)
LDLC SERPL CALC-MCNC: 129.6 MG/DL (ref 0–100)
LEUKOCYTE ESTERASE UR QL STRIP: NEGATIVE
LYMPHOCYTES NFR BLD: 25.1 % (ref 19.3–53.1)
MCH RBC QN AUTO: 29.3 PG (ref 27–32)
MCHC RBC AUTO-ENTMCNC: 30.6 G/DL (ref 32–36)
MCV RBC AUTO: 95.6 FL (ref 82–100)
MONOCYTES NFR BLD: 7.6 % (ref 4.7–12.5)
MUCOUS THREADS URNS QL MICRO: NEGATIVE
NEUTROPHILS # BLD AUTO: 4.68 X 10 3/UL (ref 2.15–7.56)
NEUTROPHILS NFR BLD: 65.1 %
NITRITE UR QL STRIP: NEGATIVE
NUCLEATED RED BLOOD CELLS: 0 /100 WBC (ref 0–0.2)
OCCULT BLOOD: ABNORMAL
PH, URINE: 7 (ref 5–7.5)
PLATELET # BLD AUTO: 320 X 10 3/UL (ref 135–400)
POTASSIUM: 4.9 MMOL/L (ref 3.5–5.1)
PROT SNV-MCNC: 6.6 G/DL (ref 6.4–8.3)
PROT UR QL STRIP: NEGATIVE MG/DL
RBC # BLD AUTO: 4.13 X 10 6/UL (ref 4.2–5.4)
RBC/HPF: ABNORMAL
RDW-SD: 46.1 FL (ref 37–54)
SODIUM: 139 MMOL/L (ref 136–145)
SP GR UR STRIP: 1.01 (ref 1–1.03)
TRIGL SERPL-MCNC: 47 MG/DL (ref 0–150)
TSH SERPL DL<=0.005 MIU/L-ACNC: 1.96 UIU/ML (ref 0.27–4.2)
UREA NITROGEN (BUN): 12.2 MG/DL (ref 6–20)
UROBILINOGEN, URINE: NORMAL E.U./DL (ref 0–1)
WBC # BLD: 7.2 X 10 3/UL (ref 4.3–10.8)
WBC/HPF: ABNORMAL

## 2021-03-26 PROCEDURE — 99214 PR OFFICE/OUTPT VISIT, EST, LEVL IV, 30-39 MIN: ICD-10-PCS | Mod: S$GLB,,, | Performed by: FAMILY MEDICINE

## 2021-03-26 PROCEDURE — 99214 OFFICE O/P EST MOD 30 MIN: CPT | Mod: S$GLB,,, | Performed by: FAMILY MEDICINE

## 2021-03-26 RX ORDER — DOXYCYCLINE 100 MG/1
100 CAPSULE ORAL 2 TIMES DAILY
Qty: 28 CAPSULE | Refills: 0 | Status: SHIPPED | OUTPATIENT
Start: 2021-03-26 | End: 2021-04-09

## 2021-03-26 RX ORDER — METHYLPREDNISOLONE 4 MG/1
TABLET ORAL
Qty: 1 PACKAGE | Refills: 0 | Status: SHIPPED | OUTPATIENT
Start: 2021-03-26 | End: 2022-06-08

## 2021-03-26 RX ORDER — DICLOFENAC SODIUM 10 MG/G
4 GEL TOPICAL 4 TIMES DAILY
Qty: 100 G | Refills: 1 | Status: SHIPPED | OUTPATIENT
Start: 2021-03-26 | End: 2023-03-08

## 2021-03-29 ENCOUNTER — PATIENT MESSAGE (OUTPATIENT)
Dept: FAMILY MEDICINE | Facility: CLINIC | Age: 36
End: 2021-03-29

## 2021-03-30 ENCOUNTER — PATIENT MESSAGE (OUTPATIENT)
Dept: FAMILY MEDICINE | Facility: CLINIC | Age: 36
End: 2021-03-30

## 2021-04-08 ENCOUNTER — OFFICE VISIT (OUTPATIENT)
Dept: ORTHOPEDICS | Facility: CLINIC | Age: 36
End: 2021-04-08
Payer: MEDICAID

## 2021-04-08 VITALS — HEIGHT: 63 IN | WEIGHT: 196.31 LBS | TEMPERATURE: 98 F | BODY MASS INDEX: 34.78 KG/M2

## 2021-04-08 DIAGNOSIS — G56.03 BILATERAL CARPAL TUNNEL SYNDROME: Primary | ICD-10-CM

## 2021-04-08 DIAGNOSIS — M25.521 PAIN AND SWELLING OF ELBOW, RIGHT: ICD-10-CM

## 2021-04-08 DIAGNOSIS — M25.421 PAIN AND SWELLING OF ELBOW, RIGHT: ICD-10-CM

## 2021-04-08 DIAGNOSIS — G56.20 ULNAR NERVE ENTRAPMENT AT WRIST, UNSPECIFIED LATERALITY: ICD-10-CM

## 2021-04-08 PROCEDURE — 20526 THER INJECTION CARP TUNNEL: CPT | Mod: 50,S$GLB,, | Performed by: ORTHOPAEDIC SURGERY

## 2021-04-08 PROCEDURE — 20526 CARPAL TUNNEL: ICD-10-PCS | Mod: 50,S$GLB,, | Performed by: ORTHOPAEDIC SURGERY

## 2021-04-08 PROCEDURE — 99203 OFFICE O/P NEW LOW 30 MIN: CPT | Mod: 25,S$GLB,, | Performed by: ORTHOPAEDIC SURGERY

## 2021-04-08 PROCEDURE — 99203 PR OFFICE/OUTPT VISIT, NEW, LEVL III, 30-44 MIN: ICD-10-PCS | Mod: 25,S$GLB,, | Performed by: ORTHOPAEDIC SURGERY

## 2021-12-28 ENCOUNTER — OFFICE VISIT (OUTPATIENT)
Dept: OBSTETRICS AND GYNECOLOGY | Facility: CLINIC | Age: 36
End: 2021-12-28
Payer: MEDICAID

## 2021-12-28 VITALS
BODY MASS INDEX: 38.26 KG/M2 | DIASTOLIC BLOOD PRESSURE: 82 MMHG | SYSTOLIC BLOOD PRESSURE: 129 MMHG | HEART RATE: 89 BPM | WEIGHT: 216 LBS

## 2021-12-28 DIAGNOSIS — Z01.419 WOMEN'S ANNUAL ROUTINE GYNECOLOGICAL EXAMINATION: Primary | ICD-10-CM

## 2021-12-28 LAB — HUMAN PAPILLOMAVIRUS (HPV): NORMAL

## 2021-12-28 PROCEDURE — 1159F PR MEDICATION LIST DOCUMENTED IN MEDICAL RECORD: ICD-10-PCS | Mod: CPTII,S$GLB,, | Performed by: OBSTETRICS & GYNECOLOGY

## 2021-12-28 PROCEDURE — 1159F MED LIST DOCD IN RCRD: CPT | Mod: CPTII,S$GLB,, | Performed by: OBSTETRICS & GYNECOLOGY

## 2021-12-28 PROCEDURE — 3074F SYST BP LT 130 MM HG: CPT | Mod: CPTII,S$GLB,, | Performed by: OBSTETRICS & GYNECOLOGY

## 2021-12-28 PROCEDURE — 3044F HG A1C LEVEL LT 7.0%: CPT | Mod: CPTII,S$GLB,, | Performed by: OBSTETRICS & GYNECOLOGY

## 2021-12-28 PROCEDURE — 3008F PR BODY MASS INDEX (BMI) DOCUMENTED: ICD-10-PCS | Mod: CPTII,S$GLB,, | Performed by: OBSTETRICS & GYNECOLOGY

## 2021-12-28 PROCEDURE — 3044F PR MOST RECENT HEMOGLOBIN A1C LEVEL <7.0%: ICD-10-PCS | Mod: CPTII,S$GLB,, | Performed by: OBSTETRICS & GYNECOLOGY

## 2021-12-28 PROCEDURE — 99395 PREV VISIT EST AGE 18-39: CPT | Mod: S$GLB,,, | Performed by: OBSTETRICS & GYNECOLOGY

## 2021-12-28 PROCEDURE — 99395 PR PREVENTIVE VISIT,EST,18-39: ICD-10-PCS | Mod: S$GLB,,, | Performed by: OBSTETRICS & GYNECOLOGY

## 2021-12-28 PROCEDURE — 3074F PR MOST RECENT SYSTOLIC BLOOD PRESSURE < 130 MM HG: ICD-10-PCS | Mod: CPTII,S$GLB,, | Performed by: OBSTETRICS & GYNECOLOGY

## 2021-12-28 PROCEDURE — 3079F DIAST BP 80-89 MM HG: CPT | Mod: CPTII,S$GLB,, | Performed by: OBSTETRICS & GYNECOLOGY

## 2021-12-28 PROCEDURE — 3008F BODY MASS INDEX DOCD: CPT | Mod: CPTII,S$GLB,, | Performed by: OBSTETRICS & GYNECOLOGY

## 2021-12-28 PROCEDURE — 3079F PR MOST RECENT DIASTOLIC BLOOD PRESSURE 80-89 MM HG: ICD-10-PCS | Mod: CPTII,S$GLB,, | Performed by: OBSTETRICS & GYNECOLOGY

## 2022-05-05 ENCOUNTER — PATIENT MESSAGE (OUTPATIENT)
Dept: ADMINISTRATIVE | Facility: HOSPITAL | Age: 37
End: 2022-05-05
Payer: MEDICAID

## 2022-06-08 ENCOUNTER — OFFICE VISIT (OUTPATIENT)
Dept: FAMILY MEDICINE | Facility: CLINIC | Age: 37
End: 2022-06-08
Payer: MEDICAID

## 2022-06-08 VITALS
HEART RATE: 85 BPM | HEIGHT: 63 IN | DIASTOLIC BLOOD PRESSURE: 76 MMHG | WEIGHT: 194.69 LBS | BODY MASS INDEX: 34.5 KG/M2 | RESPIRATION RATE: 16 BRPM | OXYGEN SATURATION: 98 % | SYSTOLIC BLOOD PRESSURE: 112 MMHG

## 2022-06-08 DIAGNOSIS — D18.01 CHERRY ANGIOMA: ICD-10-CM

## 2022-06-08 DIAGNOSIS — Z00.00 WELLNESS EXAMINATION: ICD-10-CM

## 2022-06-08 DIAGNOSIS — G56.03 BILATERAL CARPAL TUNNEL SYNDROME: Primary | ICD-10-CM

## 2022-06-08 DIAGNOSIS — J34.2 DEVIATED SEPTUM: ICD-10-CM

## 2022-06-08 DIAGNOSIS — L60.0 INGROWN NAIL: ICD-10-CM

## 2022-06-08 PROCEDURE — 99214 OFFICE O/P EST MOD 30 MIN: CPT | Mod: S$GLB,,, | Performed by: PHYSICIAN ASSISTANT

## 2022-06-08 PROCEDURE — 1160F RVW MEDS BY RX/DR IN RCRD: CPT | Mod: CPTII,S$GLB,, | Performed by: PHYSICIAN ASSISTANT

## 2022-06-08 PROCEDURE — 3078F DIAST BP <80 MM HG: CPT | Mod: CPTII,S$GLB,, | Performed by: PHYSICIAN ASSISTANT

## 2022-06-08 PROCEDURE — 3008F BODY MASS INDEX DOCD: CPT | Mod: CPTII,S$GLB,, | Performed by: PHYSICIAN ASSISTANT

## 2022-06-08 PROCEDURE — 1159F MED LIST DOCD IN RCRD: CPT | Mod: CPTII,S$GLB,, | Performed by: PHYSICIAN ASSISTANT

## 2022-06-08 PROCEDURE — 99214 PR OFFICE/OUTPT VISIT, EST, LEVL IV, 30-39 MIN: ICD-10-PCS | Mod: S$GLB,,, | Performed by: PHYSICIAN ASSISTANT

## 2022-06-08 PROCEDURE — 3078F PR MOST RECENT DIASTOLIC BLOOD PRESSURE < 80 MM HG: ICD-10-PCS | Mod: CPTII,S$GLB,, | Performed by: PHYSICIAN ASSISTANT

## 2022-06-08 PROCEDURE — 1160F PR REVIEW ALL MEDS BY PRESCRIBER/CLIN PHARMACIST DOCUMENTED: ICD-10-PCS | Mod: CPTII,S$GLB,, | Performed by: PHYSICIAN ASSISTANT

## 2022-06-08 PROCEDURE — 1159F PR MEDICATION LIST DOCUMENTED IN MEDICAL RECORD: ICD-10-PCS | Mod: CPTII,S$GLB,, | Performed by: PHYSICIAN ASSISTANT

## 2022-06-08 PROCEDURE — 3074F PR MOST RECENT SYSTOLIC BLOOD PRESSURE < 130 MM HG: ICD-10-PCS | Mod: CPTII,S$GLB,, | Performed by: PHYSICIAN ASSISTANT

## 2022-06-08 PROCEDURE — 3074F SYST BP LT 130 MM HG: CPT | Mod: CPTII,S$GLB,, | Performed by: PHYSICIAN ASSISTANT

## 2022-06-08 PROCEDURE — 3008F PR BODY MASS INDEX (BMI) DOCUMENTED: ICD-10-PCS | Mod: CPTII,S$GLB,, | Performed by: PHYSICIAN ASSISTANT

## 2022-06-08 NOTE — PROGRESS NOTES
Subjective:      Patient ID: Vashti Mccormack is a 36 y.o. female.    Chief Complaint: Annual Exam (1. Dr. Velez (ENT) did not contact patient.)      HPI  Patient is here today for her annual exam and new complaints.    Annual:  Patient reports overall she has been feeling well.  She denies any early family history of cancer.  Patient reports she is up-to-date on her women's health visits.  Do for wellness labs which I have ordered today.    New complaints:  Deviated septum:  Patient reports she has history of chronic deviated septum and would like to see an ENT for consult.  I have placed this referral today.    Skin lesion:  Patient reports red skin lesions to anterior chest for several months to a year.  Not painful.  Denies any new medications or skin products.    Right great toenail:  Patient reports recurrent ingrown toenails on this foot.  None currently.  Reports that since having these issues with her toenail her great toenail is not growing as it should be in .  Will refer to Podiatry.    Bilateral carpal tunnel syndrome:  Patient reports she has had injections into these wrist in the past which have helped her carpal tunnel syndrome.  She is not ready to have surgery at this time.  She will let me know when she is ready to see a surgeon      Review of Systems   Constitutional: Negative for activity change, appetite change, chills, fatigue and fever.   HENT: Negative for nasal congestion, facial swelling and rhinorrhea.    Eyes: Negative for pain and visual disturbance.   Respiratory: Negative for cough, chest tightness and shortness of breath.    Cardiovascular: Negative for chest pain, palpitations, leg swelling and claudication.   Gastrointestinal: Negative for abdominal distention, abdominal pain, anal bleeding, blood in stool, constipation, diarrhea, nausea, vomiting and reflux.   Endocrine: Negative for polydipsia and polyuria.   Genitourinary: Negative for difficulty urinating, flank pain and  "frequency.   Musculoskeletal: Negative for arthralgias and back pain.   Integumentary:  Positive for rash.   Neurological: Positive for numbness (Bilateral carpal tunnel syndrome). Negative for dizziness, weakness, light-headedness and headaches.   Psychiatric/Behavioral: Negative for self-injury, sleep disturbance and suicidal ideas. The patient is not nervous/anxious.        Medication List with Changes/Refills   Current Medications    DICLOFENAC SODIUM (VOLTAREN) 1 % GEL    Apply 4 g topically 4 (four) times daily.    PHENTERMINE (ADIPEX-P) 37.5 MG TABLET    Take 37.5 mg by mouth before breakfast.    TRIAMCINOLONE ACETONIDE 0.1% (KENALOG) 0.1 % OINTMENT    Apply topically 2 (two) times daily.   Discontinued Medications    BUPROPION (WELLBUTRIN XL) 150 MG TB24 TABLET    Take 1 tablet (150 mg total) by mouth once daily.    METHYLPREDNISOLONE (MEDROL DOSEPACK) 4 MG TABLET    use as directed        Objective:     Vitals:    06/08/22 0903   BP: 112/76   Pulse: 85   Resp: 16   SpO2: 98%   Weight: 88.3 kg (194 lb 11.2 oz)   Height: 5' 3" (1.6 m)        Physical Exam  Constitutional:       Appearance: Normal appearance. She is normal weight.   HENT:      Head: Normocephalic and atraumatic.      Right Ear: Tympanic membrane, ear canal and external ear normal.      Left Ear: Tympanic membrane, ear canal and external ear normal.      Nose: Congestion present. No rhinorrhea.      Mouth/Throat:      Mouth: Mucous membranes are moist.      Pharynx: No oropharyngeal exudate or posterior oropharyngeal erythema.   Eyes:      General: No scleral icterus.        Right eye: No discharge.         Left eye: No discharge.      Conjunctiva/sclera: Conjunctivae normal.   Cardiovascular:      Rate and Rhythm: Normal rate and regular rhythm.      Pulses: Normal pulses.      Heart sounds: Normal heart sounds. No murmur heard.    No friction rub. No gallop. No S3 or S4 sounds.   Pulmonary:      Effort: Pulmonary effort is normal. No " respiratory distress.      Breath sounds: Normal breath sounds. No wheezing, rhonchi or rales.   Musculoskeletal:      Cervical back: Normal range of motion. No rigidity or tenderness.      Right lower leg: No edema.      Left lower leg: No edema.      Comments: Moves all extremities well and with good control    Skin:     Capillary Refill: Capillary refill takes less than 2 seconds.      Findings: No rash.      Comments: Right foot:  Right great toe:  The medial aspect of the great toenail is irregular and jagged.  No swelling, redness, or exudate at this time.    Skin:  Anterior chest:  There are 3 brightly red pigmented pinpoint flat lesions.  Consistent with cherry angiomas.   Neurological:      Mental Status: She is alert and oriented to person, place, and time.      Motor: No weakness.      Gait: Gait normal.   Psychiatric:         Mood and Affect: Mood normal.         Behavior: Behavior normal.         Judgment: Judgment normal.            Assessment & Plan:     Bilateral carpal tunnel syndrome  Comments:  Continue voltaren.  Pt will let me know when she is ready to see a surgeon.     Wellness examination  -     CBC Auto Differential; Future; Expected date: 06/08/2022  -     Lipid Panel; Future; Expected date: 06/08/2022  -     Urinalysis, Reflex to Urine Culture Urine, Clean Catch; Future; Expected date: 06/09/2022  -     TSH w/reflex to FT4; Future; Expected date: 06/08/2022  -     Comprehensive Metabolic Panel; Future; Expected date: 06/08/2022  -     Hemoglobin A1C; Future; Expected date: 06/08/2022    Ingrown nail  -     Ambulatory referral/consult to Podiatry; Future; Expected date: 06/15/2022    Deviated septum  -     Ambulatory referral/consult to ENT; Future; Expected date: 06/15/2022    Cherry angioma  Comments:  Reassurance            No follow-ups on file.       -Patient instructed that our office calls back on all labs and imaging within 1 week of receiving the results. Patient instructed to  reach out to our office if they have not heard from us so that we can request the results from the lab/imaging center           Micaela Li PA-C

## 2022-06-27 ENCOUNTER — OFFICE VISIT (OUTPATIENT)
Dept: ORTHOPEDICS | Facility: CLINIC | Age: 37
End: 2022-06-27
Payer: MEDICAID

## 2022-06-27 VITALS — HEIGHT: 63 IN | BODY MASS INDEX: 34.53 KG/M2 | WEIGHT: 194.88 LBS

## 2022-06-27 DIAGNOSIS — M77.11 LATERAL EPICONDYLITIS OF RIGHT ELBOW: ICD-10-CM

## 2022-06-27 DIAGNOSIS — M77.8 TRICEPS TENDINITIS: ICD-10-CM

## 2022-06-27 DIAGNOSIS — G56.03 BILATERAL CARPAL TUNNEL SYNDROME: Primary | ICD-10-CM

## 2022-06-27 PROCEDURE — 20526 CARPAL TUNNEL: ICD-10-PCS | Mod: 50,S$GLB,, | Performed by: ORTHOPAEDIC SURGERY

## 2022-06-27 PROCEDURE — 20526 THER INJECTION CARP TUNNEL: CPT | Mod: 50,S$GLB,, | Performed by: ORTHOPAEDIC SURGERY

## 2022-06-27 PROCEDURE — 99213 PR OFFICE/OUTPT VISIT, EST, LEVL III, 20-29 MIN: ICD-10-PCS | Mod: 25,S$GLB,, | Performed by: ORTHOPAEDIC SURGERY

## 2022-06-27 PROCEDURE — 3008F BODY MASS INDEX DOCD: CPT | Mod: CPTII,S$GLB,, | Performed by: ORTHOPAEDIC SURGERY

## 2022-06-27 PROCEDURE — 1160F RVW MEDS BY RX/DR IN RCRD: CPT | Mod: CPTII,S$GLB,, | Performed by: ORTHOPAEDIC SURGERY

## 2022-06-27 PROCEDURE — 1159F PR MEDICATION LIST DOCUMENTED IN MEDICAL RECORD: ICD-10-PCS | Mod: CPTII,S$GLB,, | Performed by: ORTHOPAEDIC SURGERY

## 2022-06-27 PROCEDURE — 1159F MED LIST DOCD IN RCRD: CPT | Mod: CPTII,S$GLB,, | Performed by: ORTHOPAEDIC SURGERY

## 2022-06-27 PROCEDURE — 1160F PR REVIEW ALL MEDS BY PRESCRIBER/CLIN PHARMACIST DOCUMENTED: ICD-10-PCS | Mod: CPTII,S$GLB,, | Performed by: ORTHOPAEDIC SURGERY

## 2022-06-27 PROCEDURE — 99213 OFFICE O/P EST LOW 20 MIN: CPT | Mod: 25,S$GLB,, | Performed by: ORTHOPAEDIC SURGERY

## 2022-06-27 PROCEDURE — 3008F PR BODY MASS INDEX (BMI) DOCUMENTED: ICD-10-PCS | Mod: CPTII,S$GLB,, | Performed by: ORTHOPAEDIC SURGERY

## 2022-06-27 RX ORDER — MELOXICAM 15 MG/1
15 TABLET ORAL DAILY
Qty: 90 TABLET | Refills: 4 | Status: SHIPPED | OUTPATIENT
Start: 2022-06-27

## 2022-06-27 NOTE — PROCEDURES
Carpal Tunnel    Date/Time: 6/27/2022 2:30 PM  Performed by: Nando Snyder MD  Authorized by: Nando Snyder MD     Consent Done?:  Yes (Verbal)  Prep: patient was prepped and draped in usual sterile fashion      Local anesthesia used?: No    Location:  Wrist  Site:  R carpal tunnel and L carpal tunnel  Needle size:  25 G  Approach:  Volar  Medications:  10 mg triamcinolone acetonide 10 mg/mL  Patient tolerance:  Patient tolerated the procedure well with no immediate complications

## 2022-06-27 NOTE — PROGRESS NOTES
Subjective:      Patient ID: Vashti Mccormack is a 36 y.o. female.    Chief Complaint: Pain of the Right Wrist and Pain of the Left Wrist    HPI 36-year-old lady with carpal tunnel syndrome comes in today for injection.  She got good long-term relief from prior injection.  She als complains of bilateral elbow pain.  The pain is mostly in the region of the triceps tendon insertion on the olecranon and along the lateral epicondyle with full extension.    Review of Systems   Constitutional: Negative for fever and weight loss.   Cardiovascular: Negative for chest pain and leg swelling.   Musculoskeletal: Negative for arthritis, joint pain, joint swelling, muscle weakness and stiffness.   Gastrointestinal: Negative for change in bowel habit.   Genitourinary: Negative for bladder incontinence and hematuria.   Neurological: Positive for numbness, paresthesias and sensory change. Negative for focal weakness.         Objective:      Active and passive range of motion of both elbows is normal.  She has mild tenderness with palpation of the triceps tendon insertion and along the extensor origin on the right.  She has decreased sensation in the median nerve distribution to light touch bilaterally.  She has normal capillary refill.      Ortho/SPM Exam            Assessment:       Encounter Diagnoses   Name Primary?    Bilateral carpal tunnel syndrome Yes    Triceps tendinitis     Lateral epicondylitis of right elbow           Plan:       Vashti was seen today for pain and pain.    Diagnoses and all orders for this visit:    Bilateral carpal tunnel syndrome    Triceps tendinitis    Lateral epicondylitis of right elbow    Both carpal tunnels are injected.  She is placed on meloxicam.  Return p.r.n.

## 2022-07-11 ENCOUNTER — TELEPHONE (OUTPATIENT)
Dept: OBSTETRICS AND GYNECOLOGY | Facility: CLINIC | Age: 37
End: 2022-07-11
Payer: MEDICAID

## 2022-07-12 ENCOUNTER — OFFICE VISIT (OUTPATIENT)
Dept: OBSTETRICS AND GYNECOLOGY | Facility: CLINIC | Age: 37
End: 2022-07-12
Payer: MEDICAID

## 2022-07-12 VITALS
SYSTOLIC BLOOD PRESSURE: 122 MMHG | DIASTOLIC BLOOD PRESSURE: 83 MMHG | WEIGHT: 193 LBS | BODY MASS INDEX: 34.19 KG/M2 | HEART RATE: 81 BPM

## 2022-07-12 DIAGNOSIS — N64.4 BREAST PAIN: Primary | ICD-10-CM

## 2022-07-12 PROCEDURE — 3008F BODY MASS INDEX DOCD: CPT | Mod: CPTII,S$GLB,, | Performed by: OBSTETRICS & GYNECOLOGY

## 2022-07-12 PROCEDURE — 3079F DIAST BP 80-89 MM HG: CPT | Mod: CPTII,S$GLB,, | Performed by: OBSTETRICS & GYNECOLOGY

## 2022-07-12 PROCEDURE — 99212 OFFICE O/P EST SF 10 MIN: CPT | Mod: S$GLB,,, | Performed by: OBSTETRICS & GYNECOLOGY

## 2022-07-12 PROCEDURE — 3074F SYST BP LT 130 MM HG: CPT | Mod: CPTII,S$GLB,, | Performed by: OBSTETRICS & GYNECOLOGY

## 2022-07-12 PROCEDURE — 99212 PR OFFICE/OUTPT VISIT, EST, LEVL II, 10-19 MIN: ICD-10-PCS | Mod: S$GLB,,, | Performed by: OBSTETRICS & GYNECOLOGY

## 2022-07-12 PROCEDURE — 1159F MED LIST DOCD IN RCRD: CPT | Mod: CPTII,S$GLB,, | Performed by: OBSTETRICS & GYNECOLOGY

## 2022-07-12 PROCEDURE — 1159F PR MEDICATION LIST DOCUMENTED IN MEDICAL RECORD: ICD-10-PCS | Mod: CPTII,S$GLB,, | Performed by: OBSTETRICS & GYNECOLOGY

## 2022-07-12 PROCEDURE — 3008F PR BODY MASS INDEX (BMI) DOCUMENTED: ICD-10-PCS | Mod: CPTII,S$GLB,, | Performed by: OBSTETRICS & GYNECOLOGY

## 2022-07-12 PROCEDURE — 3079F PR MOST RECENT DIASTOLIC BLOOD PRESSURE 80-89 MM HG: ICD-10-PCS | Mod: CPTII,S$GLB,, | Performed by: OBSTETRICS & GYNECOLOGY

## 2022-07-12 PROCEDURE — 3074F PR MOST RECENT SYSTOLIC BLOOD PRESSURE < 130 MM HG: ICD-10-PCS | Mod: CPTII,S$GLB,, | Performed by: OBSTETRICS & GYNECOLOGY

## 2022-07-12 NOTE — PROGRESS NOTES
Subjective:       Patient ID: Vashti Mccormack is a 36 y.o. female.    Chief Complaint:  Breast Pain      History of Present Illness  Pt here for breast lump.  History and past labs reviewed with patient.    Complaints of having a lump in her right armpit. States that it has since gone away       Review of Systems  Review of Systems   Constitutional: Negative for chills and fever.   Respiratory: Negative for shortness of breath.    Cardiovascular: Negative for chest pain.   Gastrointestinal: Negative for abdominal pain, blood in stool, constipation, diarrhea, nausea, vomiting and reflux.   Genitourinary: Negative for dysmenorrhea, dyspareunia, dysuria, hematuria, hot flashes, menorrhagia, menstrual problem, pelvic pain, vaginal bleeding, vaginal discharge, postcoital bleeding and vaginal dryness.   Musculoskeletal: Negative for arthralgias and joint swelling.   Integumentary:  Negative for rash, hair changes, breast mass, nipple discharge and breast skin changes.   Psychiatric/Behavioral: Negative for depression. The patient is not nervous/anxious.    Breast: Negative for asymmetry, lump, mass, nipple discharge and skin changes          Objective:     Vitals:    07/12/22 1406   BP: 122/83   Pulse: 81   Weight: 87.5 kg (193 lb)       Physical Exam    breast exam wnl- no nipple dc, skin changes, masses or lymph nodes palpated bilaterally    Assessment:        1. Breast pain                Plan:      Given reassurance   Offered MMG - pt declined ( I agree)   RTC PRN

## 2022-08-16 ENCOUNTER — OFFICE VISIT (OUTPATIENT)
Dept: OBSTETRICS AND GYNECOLOGY | Facility: CLINIC | Age: 37
End: 2022-08-16
Payer: MEDICAID

## 2022-08-16 VITALS
SYSTOLIC BLOOD PRESSURE: 129 MMHG | WEIGHT: 191 LBS | BODY MASS INDEX: 33.83 KG/M2 | DIASTOLIC BLOOD PRESSURE: 84 MMHG | HEART RATE: 80 BPM

## 2022-08-16 DIAGNOSIS — Z30.46 ENCOUNTER FOR REMOVAL AND REINSERTION OF NEXPLANON: Primary | ICD-10-CM

## 2022-08-16 PROCEDURE — 3079F DIAST BP 80-89 MM HG: CPT | Mod: CPTII,S$GLB,, | Performed by: OBSTETRICS & GYNECOLOGY

## 2022-08-16 PROCEDURE — 1159F PR MEDICATION LIST DOCUMENTED IN MEDICAL RECORD: ICD-10-PCS | Mod: CPTII,S$GLB,, | Performed by: OBSTETRICS & GYNECOLOGY

## 2022-08-16 PROCEDURE — 3008F BODY MASS INDEX DOCD: CPT | Mod: CPTII,S$GLB,, | Performed by: OBSTETRICS & GYNECOLOGY

## 2022-08-16 PROCEDURE — 11983 REMOVE/INSERT DRUG IMPLANT: CPT | Mod: S$GLB,,, | Performed by: OBSTETRICS & GYNECOLOGY

## 2022-08-16 PROCEDURE — 3008F PR BODY MASS INDEX (BMI) DOCUMENTED: ICD-10-PCS | Mod: CPTII,S$GLB,, | Performed by: OBSTETRICS & GYNECOLOGY

## 2022-08-16 PROCEDURE — 99499 UNLISTED E&M SERVICE: CPT | Mod: S$GLB,,, | Performed by: OBSTETRICS & GYNECOLOGY

## 2022-08-16 PROCEDURE — 3079F PR MOST RECENT DIASTOLIC BLOOD PRESSURE 80-89 MM HG: ICD-10-PCS | Mod: CPTII,S$GLB,, | Performed by: OBSTETRICS & GYNECOLOGY

## 2022-08-16 PROCEDURE — 11983 PR REMOVAL W/ REINSERT DRUG IMPLANT DEVICE: ICD-10-PCS | Mod: S$GLB,,, | Performed by: OBSTETRICS & GYNECOLOGY

## 2022-08-16 PROCEDURE — 99499 NO LOS: ICD-10-PCS | Mod: S$GLB,,, | Performed by: OBSTETRICS & GYNECOLOGY

## 2022-08-16 PROCEDURE — 3074F SYST BP LT 130 MM HG: CPT | Mod: CPTII,S$GLB,, | Performed by: OBSTETRICS & GYNECOLOGY

## 2022-08-16 PROCEDURE — 3074F PR MOST RECENT SYSTOLIC BLOOD PRESSURE < 130 MM HG: ICD-10-PCS | Mod: CPTII,S$GLB,, | Performed by: OBSTETRICS & GYNECOLOGY

## 2022-08-16 PROCEDURE — 1159F MED LIST DOCD IN RCRD: CPT | Mod: CPTII,S$GLB,, | Performed by: OBSTETRICS & GYNECOLOGY

## 2022-08-16 RX ORDER — MONTELUKAST SODIUM 10 MG/1
TABLET ORAL
COMMUNITY
Start: 2022-07-21 | End: 2023-03-08

## 2022-08-17 PROBLEM — Z30.46 ENCOUNTER FOR REMOVAL AND REINSERTION OF NEXPLANON: Status: ACTIVE | Noted: 2022-08-17

## 2022-08-17 NOTE — PROGRESS NOTES
Vashti Mccormack is a 36 y.o. female  presents for Nexplanon replacement.    UPT negative.    Date/Time: 2022   Performed by: Jaya Gan   Authorized by: Jaya Gan     Consent obtained:  Verbal  Consent given by:  Patient  Patient questions answered: yes    Patient agrees, verbalizes understanding, and wants to proceed: yes    Instructions and paperwork completed: yes          Nexplanon palpated through the skin.  Area wiped with rubbing alcohol.  3 cc of 1% lidocaine without epinephrine was injected in the subcutaneous tissue.  The area was prepped with betadine.  A stabbing incision was made with an 11 blade scalpel.  The Nexplanon was identified and grasped with curved hemostat.  It was removed intact.      Left/right:  Left    68 mg etonogestrel 68 mg  Preloaded Implanon trocar was placed subdermally: yes    Visualization of implant was obtained: yes    Nexplanon was inserted and trocar removed: yes    Visualization of notch in stilette and palpitation of device: yes    Palpitation confirms placement by provider and patient: yes      A bandage was placed over the incision site. Pt tolerated the procedure well.

## 2022-08-26 ENCOUNTER — PATIENT MESSAGE (OUTPATIENT)
Dept: FAMILY MEDICINE | Facility: CLINIC | Age: 37
End: 2022-08-26
Payer: MEDICAID

## 2022-12-29 ENCOUNTER — OFFICE VISIT (OUTPATIENT)
Dept: OBSTETRICS AND GYNECOLOGY | Facility: CLINIC | Age: 37
End: 2022-12-29
Payer: MEDICAID

## 2022-12-29 VITALS
WEIGHT: 199 LBS | SYSTOLIC BLOOD PRESSURE: 120 MMHG | HEART RATE: 86 BPM | DIASTOLIC BLOOD PRESSURE: 80 MMHG | BODY MASS INDEX: 35.25 KG/M2

## 2022-12-29 DIAGNOSIS — Z01.419 WOMEN'S ANNUAL ROUTINE GYNECOLOGICAL EXAMINATION: Primary | ICD-10-CM

## 2022-12-29 PROCEDURE — 3074F SYST BP LT 130 MM HG: CPT | Mod: CPTII,S$GLB,, | Performed by: OBSTETRICS & GYNECOLOGY

## 2022-12-29 PROCEDURE — 3079F PR MOST RECENT DIASTOLIC BLOOD PRESSURE 80-89 MM HG: ICD-10-PCS | Mod: CPTII,S$GLB,, | Performed by: OBSTETRICS & GYNECOLOGY

## 2022-12-29 PROCEDURE — 99395 PR PREVENTIVE VISIT,EST,18-39: ICD-10-PCS | Mod: S$GLB,,, | Performed by: OBSTETRICS & GYNECOLOGY

## 2022-12-29 PROCEDURE — 1159F MED LIST DOCD IN RCRD: CPT | Mod: CPTII,S$GLB,, | Performed by: OBSTETRICS & GYNECOLOGY

## 2022-12-29 PROCEDURE — 3008F BODY MASS INDEX DOCD: CPT | Mod: CPTII,S$GLB,, | Performed by: OBSTETRICS & GYNECOLOGY

## 2022-12-29 PROCEDURE — 3008F PR BODY MASS INDEX (BMI) DOCUMENTED: ICD-10-PCS | Mod: CPTII,S$GLB,, | Performed by: OBSTETRICS & GYNECOLOGY

## 2022-12-29 PROCEDURE — 3079F DIAST BP 80-89 MM HG: CPT | Mod: CPTII,S$GLB,, | Performed by: OBSTETRICS & GYNECOLOGY

## 2022-12-29 PROCEDURE — 3074F PR MOST RECENT SYSTOLIC BLOOD PRESSURE < 130 MM HG: ICD-10-PCS | Mod: CPTII,S$GLB,, | Performed by: OBSTETRICS & GYNECOLOGY

## 2022-12-29 PROCEDURE — 1159F PR MEDICATION LIST DOCUMENTED IN MEDICAL RECORD: ICD-10-PCS | Mod: CPTII,S$GLB,, | Performed by: OBSTETRICS & GYNECOLOGY

## 2022-12-29 PROCEDURE — 99395 PREV VISIT EST AGE 18-39: CPT | Mod: S$GLB,,, | Performed by: OBSTETRICS & GYNECOLOGY

## 2022-12-30 NOTE — PROGRESS NOTES
Subjective:       Patient ID: Vashti Mccormack is a 37 y.o. female.    Chief Complaint:  Well Woman      History of Present Illness  Pt here for gyn annual.  History and past labs reviewed with patient.    Complaints none       Review of Systems  Review of Systems   Constitutional:  Negative for chills and fever.   Respiratory:  Negative for shortness of breath.    Cardiovascular:  Negative for chest pain.   Gastrointestinal:  Negative for abdominal pain, blood in stool, constipation, diarrhea, nausea, vomiting and reflux.   Genitourinary:  Negative for dysmenorrhea, dyspareunia, dysuria, hematuria, hot flashes, menorrhagia, menstrual problem, pelvic pain, vaginal bleeding, vaginal discharge, postcoital bleeding and vaginal dryness.   Musculoskeletal:  Negative for arthralgias and joint swelling.   Integumentary:  Negative for rash, hair changes, breast mass, nipple discharge and breast skin changes.   Psychiatric/Behavioral:  Negative for depression. The patient is not nervous/anxious.    Breast: Negative for asymmetry, lump, mass, nipple discharge and skin changes        Objective:     Vitals:    12/29/22 1407   BP: 120/80   Pulse: 86   Weight: 90.3 kg (199 lb)       Physical Exam:   Constitutional: She appears well-developed and well-nourished. No distress.    HENT:   Head: Normocephalic and atraumatic.    Eyes: Conjunctivae and EOM are normal.    Neck: No tracheal deviation present. No thyromegaly present.    Cardiovascular:       Exam reveals no clubbing, no cyanosis and no edema.        Pulmonary/Chest: Effort normal. No respiratory distress.        Abdominal: Soft. She exhibits no distension and no mass. There is no abdominal tenderness. There is no rebound and no guarding. No hernia.     Genitourinary:    Vagina, uterus and rectum normal.      Pelvic exam was performed with patient supine.   There is no rash, tenderness, lesion or injury on the right labia. There is no rash, tenderness, lesion or injury on  the left labia. Cervix is normal. Right adnexum displays no mass, no tenderness and no fullness. Left adnexum displays no mass, no tenderness and no fullness.                Skin: She is not diaphoretic. No cyanosis. Nails show no clubbing.      breast exam wnl- no nipple dc, skin changes, masses or lymph nodes palpated bilaterally    Assessment:        1. Women's annual routine gynecological examination                  Plan:      Annual   Pap UTD   STD panel declined   Contraception - nexplanon   Risk assessment for inherited gyn cancer done - neg   RTC  1 year

## 2023-01-31 ENCOUNTER — OFFICE VISIT (OUTPATIENT)
Dept: ORTHOPEDICS | Facility: CLINIC | Age: 38
End: 2023-01-31
Payer: MEDICAID

## 2023-01-31 DIAGNOSIS — G56.03 BILATERAL CARPAL TUNNEL SYNDROME: Primary | ICD-10-CM

## 2023-01-31 PROCEDURE — 20526 CARPAL TUNNEL: ICD-10-PCS | Mod: 50,S$GLB,, | Performed by: ORTHOPAEDIC SURGERY

## 2023-01-31 PROCEDURE — 99212 OFFICE O/P EST SF 10 MIN: CPT | Mod: 25,S$GLB,, | Performed by: ORTHOPAEDIC SURGERY

## 2023-01-31 PROCEDURE — 1159F PR MEDICATION LIST DOCUMENTED IN MEDICAL RECORD: ICD-10-PCS | Mod: CPTII,S$GLB,, | Performed by: ORTHOPAEDIC SURGERY

## 2023-01-31 PROCEDURE — 1159F MED LIST DOCD IN RCRD: CPT | Mod: CPTII,S$GLB,, | Performed by: ORTHOPAEDIC SURGERY

## 2023-01-31 PROCEDURE — 20526 THER INJECTION CARP TUNNEL: CPT | Mod: 50,S$GLB,, | Performed by: ORTHOPAEDIC SURGERY

## 2023-01-31 PROCEDURE — 1160F PR REVIEW ALL MEDS BY PRESCRIBER/CLIN PHARMACIST DOCUMENTED: ICD-10-PCS | Mod: CPTII,S$GLB,, | Performed by: ORTHOPAEDIC SURGERY

## 2023-01-31 PROCEDURE — 99212 PR OFFICE/OUTPT VISIT, EST, LEVL II, 10-19 MIN: ICD-10-PCS | Mod: 25,S$GLB,, | Performed by: ORTHOPAEDIC SURGERY

## 2023-01-31 PROCEDURE — 1160F RVW MEDS BY RX/DR IN RCRD: CPT | Mod: CPTII,S$GLB,, | Performed by: ORTHOPAEDIC SURGERY

## 2023-01-31 NOTE — PROGRESS NOTES
Subjective:      Patient ID: Vashti Mccormack is a 37 y.o. female.    Chief Complaint: Pain of the Right Hand and Pain of the Left Hand    HPI 37-year-old lady comes in with bilateral hand numbness.  She has known carpal tunnel syndrome from prior nerve conduction studies.  She got several months of relief from prior injection    ROS unchanged from prior visit      Objective:      Patient has decreased sensation in the median nerve distribution to light touch.  She has normal capillary refill.  She has a positive carpal compression test bilaterally.      Ortho/SPM Exam            Assessment:       Encounter Diagnosis   Name Primary?    Bilateral carpal tunnel syndrome Yes          Plan:       Vashti was seen today for pain and pain.    Diagnoses and all orders for this visit:    Bilateral carpal tunnel syndrome    Both carpal tunnels are injected today.  She is referred back to her primary care physician to find another orthopedic provider

## 2023-01-31 NOTE — PROCEDURES
Carpal Tunnel    Date/Time: 1/31/2023 1:15 PM  Performed by: Nando Snyder MD  Authorized by: Nando Snyder MD     Consent Done?:  Yes (Verbal)  Prep: patient was prepped and draped in usual sterile fashion      Local anesthesia used?: No    Location:  Wrist  Site:  R carpal tunnel and L carpal tunnel  Needle size:  25 G  Approach:  Volar  Medications:  5 mg triamcinolone acetonide 10 mg/mL; 5 mg triamcinolone acetonide 10 mg/mL  Patient tolerance:  Patient tolerated the procedure well with no immediate complications

## 2023-03-08 ENCOUNTER — OFFICE VISIT (OUTPATIENT)
Dept: FAMILY MEDICINE | Facility: CLINIC | Age: 38
End: 2023-03-08
Payer: MEDICAID

## 2023-03-08 VITALS
OXYGEN SATURATION: 97 % | DIASTOLIC BLOOD PRESSURE: 70 MMHG | WEIGHT: 201.5 LBS | SYSTOLIC BLOOD PRESSURE: 126 MMHG | HEIGHT: 63 IN | BODY MASS INDEX: 35.7 KG/M2 | HEART RATE: 80 BPM

## 2023-03-08 DIAGNOSIS — M77.11 LATERAL EPICONDYLITIS OF RIGHT ELBOW: ICD-10-CM

## 2023-03-08 DIAGNOSIS — G56.03 CARPAL TUNNEL SYNDROME ON BOTH SIDES: Primary | ICD-10-CM

## 2023-03-08 PROCEDURE — 1159F PR MEDICATION LIST DOCUMENTED IN MEDICAL RECORD: ICD-10-PCS | Mod: CPTII,S$GLB,, | Performed by: NURSE PRACTITIONER

## 2023-03-08 PROCEDURE — 1160F PR REVIEW ALL MEDS BY PRESCRIBER/CLIN PHARMACIST DOCUMENTED: ICD-10-PCS | Mod: CPTII,S$GLB,, | Performed by: NURSE PRACTITIONER

## 2023-03-08 PROCEDURE — 99214 PR OFFICE/OUTPT VISIT, EST, LEVL IV, 30-39 MIN: ICD-10-PCS | Mod: 25,S$GLB,, | Performed by: NURSE PRACTITIONER

## 2023-03-08 PROCEDURE — 3074F SYST BP LT 130 MM HG: CPT | Mod: CPTII,S$GLB,, | Performed by: NURSE PRACTITIONER

## 2023-03-08 PROCEDURE — 1160F RVW MEDS BY RX/DR IN RCRD: CPT | Mod: CPTII,S$GLB,, | Performed by: NURSE PRACTITIONER

## 2023-03-08 PROCEDURE — 3008F BODY MASS INDEX DOCD: CPT | Mod: CPTII,S$GLB,, | Performed by: NURSE PRACTITIONER

## 2023-03-08 PROCEDURE — 3008F PR BODY MASS INDEX (BMI) DOCUMENTED: ICD-10-PCS | Mod: CPTII,S$GLB,, | Performed by: NURSE PRACTITIONER

## 2023-03-08 PROCEDURE — 3074F PR MOST RECENT SYSTOLIC BLOOD PRESSURE < 130 MM HG: ICD-10-PCS | Mod: CPTII,S$GLB,, | Performed by: NURSE PRACTITIONER

## 2023-03-08 PROCEDURE — 3078F DIAST BP <80 MM HG: CPT | Mod: CPTII,S$GLB,, | Performed by: NURSE PRACTITIONER

## 2023-03-08 PROCEDURE — 1159F MED LIST DOCD IN RCRD: CPT | Mod: CPTII,S$GLB,, | Performed by: NURSE PRACTITIONER

## 2023-03-08 PROCEDURE — 3078F PR MOST RECENT DIASTOLIC BLOOD PRESSURE < 80 MM HG: ICD-10-PCS | Mod: CPTII,S$GLB,, | Performed by: NURSE PRACTITIONER

## 2023-03-08 PROCEDURE — 99214 OFFICE O/P EST MOD 30 MIN: CPT | Mod: 25,S$GLB,, | Performed by: NURSE PRACTITIONER

## 2023-03-08 RX ORDER — DICLOFENAC SODIUM 10 MG/G
2 GEL TOPICAL 4 TIMES DAILY
COMMUNITY

## 2023-03-08 RX ORDER — PREDNISONE 20 MG/1
TABLET ORAL
Qty: 18 TABLET | Refills: 0 | Status: SHIPPED | OUTPATIENT
Start: 2023-03-08 | End: 2023-06-12

## 2023-03-08 RX ORDER — KETOROLAC TROMETHAMINE 30 MG/ML
60 INJECTION, SOLUTION INTRAMUSCULAR; INTRAVENOUS
Status: COMPLETED | OUTPATIENT
Start: 2023-03-08 | End: 2023-03-08

## 2023-03-08 RX ADMIN — KETOROLAC TROMETHAMINE 60 MG: 30 INJECTION, SOLUTION INTRAMUSCULAR; INTRAVENOUS at 03:03

## 2023-03-08 NOTE — PROGRESS NOTES
"Patient ID: Vashti Mccormack  MRN: [id number]    Chief Complaint: Other (Right elbow pain)      Allergies: Patient has No Known Allergies.     Smoking status:  Social History     Tobacco Use   Smoking Status Never   Smokeless Tobacco Never       Problem List:  Patient Active Problem List   Diagnosis    S/P  section    Encounter for removal and reinsertion of Nexplanon        Current Medications:  Current Outpatient Medications   Medication Instructions    diclofenac sodium (VOLTAREN) 2 g, Topical (Top), 4 times daily    meloxicam (MOBIC) 15 mg, Oral, Daily    predniSONE (DELTASONE) 20 MG tablet Take 3 tablets daily x 3 days, two tablets daily x 3 days , one tablet daily x 3 days       History of Present Illness:  The patient is a 37 y.o. White female who presents to clinic for evaluation and management with a chief complaint of Other (Right elbow pain)     HPI Pt presents with pain to the lateral epicondyle area extending down the right lateral arm beneath the elbow. She describes it as feeling "tight" and hurts peforming certain movements such as opening jars. 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     In addition she has been seeing Dr. Snyder for bilateral carpal tunnel syndrome and has received steroid injections for relief of the pain. She also sleeps with wrists on. She was told she will eventually need surgery and is requesting a referral.     Review of Systems   Constitutional: Negative.    HENT: Negative.     Eyes: Negative.    Respiratory: Negative.     Cardiovascular: Negative.    Gastrointestinal: Negative.    Endocrine: Negative.    Genitourinary: Negative.    Musculoskeletal:  Positive for myalgias.        See HPI    Integumentary:  Negative.   Allergic/Immunologic: Negative.    Neurological: Negative.    Hematological: Negative.    Psychiatric/Behavioral: Negative.        Visit Vitals  /70 (BP Location: Left arm, Patient " "Position: Sitting, BP Method: Medium (Manual))   Pulse 80   Ht 5' 3" (1.6 m)   Wt 91.4 kg (201 lb 8 oz)   SpO2 97%   BMI 35.69 kg/m²       Physical Exam  Vitals and nursing note reviewed.   Constitutional:       Appearance: Normal appearance.   HENT:      Head: Normocephalic.      Nose: Nose normal.      Mouth/Throat:      Mouth: Mucous membranes are moist.      Pharynx: Oropharynx is clear.   Eyes:      Conjunctiva/sclera: Conjunctivae normal.   Cardiovascular:      Rate and Rhythm: Normal rate and regular rhythm.   Pulmonary:      Effort: Pulmonary effort is normal.      Breath sounds: Normal breath sounds.   Musculoskeletal:      Cervical back: Normal range of motion.      Comments: Some pain with lateral rotation of right wrist , c/o a pulling tight sensation. There was point tenderness noted to lateral epicondyle with pain radiating along lateral part of arm. There was no increased warmth and no erythema.    Skin:     General: Skin is warm and dry.   Neurological:      General: No focal deficit present.      Mental Status: She is alert.   Psychiatric:         Mood and Affect: Mood normal.         Behavior: Behavior normal.        Assessment & Plan:  1. Carpal tunnel syndrome on both sides  -     Ambulatory referral/consult to Orthopedics; Future; Expected date: 03/15/2023    2. Lateral epicondylitis of right elbow  -     predniSONE (DELTASONE) 20 MG tablet; Take 3 tablets daily x 3 days, two tablets daily x 3 days , one tablet daily x 3 days  Dispense: 18 tablet; Refill: 0  -     ketorolac injection 60 mg  Written instructions for exercises provided to patient and instructed to where a compression elbow sleeve , perform twice a day ice massage and alternate 1000 mg of tylenol with 800 mg ibuprofen q 4 hours over the next 3-4 days. Instructed to take the prednisone as directed in the am with food. Call for any worsening sx or sx that are not improving.        Future Appointments   Date Time Provider Department " Center   6/12/2023  9:30 AM Micaela Li PA-C [facility] [address]   1/2/2024  1:15 PM Jaya Gan MD [id number] [address]     Return for any worsening sx.           Jacki Sue NP         Lab Frequency Next Occurrence   Ambulatory referral/consult to Podiatry Once 06/15/2022   Ambulatory referral/consult to ENT Once 06/15/2022

## 2023-06-12 ENCOUNTER — OFFICE VISIT (OUTPATIENT)
Dept: FAMILY MEDICINE | Facility: CLINIC | Age: 38
End: 2023-06-12
Payer: MEDICAID

## 2023-06-12 VITALS
HEIGHT: 63 IN | SYSTOLIC BLOOD PRESSURE: 117 MMHG | WEIGHT: 201 LBS | HEART RATE: 87 BPM | OXYGEN SATURATION: 97 % | DIASTOLIC BLOOD PRESSURE: 65 MMHG | BODY MASS INDEX: 35.61 KG/M2

## 2023-06-12 DIAGNOSIS — G56.03 CARPAL TUNNEL SYNDROME ON BOTH SIDES: ICD-10-CM

## 2023-06-12 DIAGNOSIS — E66.9 OBESITY, UNSPECIFIED CLASSIFICATION, UNSPECIFIED OBESITY TYPE, UNSPECIFIED WHETHER SERIOUS COMORBIDITY PRESENT: Primary | ICD-10-CM

## 2023-06-12 DIAGNOSIS — H93.19 TINNITUS, UNSPECIFIED LATERALITY: ICD-10-CM

## 2023-06-12 DIAGNOSIS — Z00.00 PREVENTATIVE HEALTH CARE: ICD-10-CM

## 2023-06-12 PROCEDURE — 99395 PREV VISIT EST AGE 18-39: CPT | Mod: S$GLB,,, | Performed by: PHYSICIAN ASSISTANT

## 2023-06-12 PROCEDURE — 3008F PR BODY MASS INDEX (BMI) DOCUMENTED: ICD-10-PCS | Mod: CPTII,S$GLB,, | Performed by: PHYSICIAN ASSISTANT

## 2023-06-12 PROCEDURE — 3074F PR MOST RECENT SYSTOLIC BLOOD PRESSURE < 130 MM HG: ICD-10-PCS | Mod: CPTII,S$GLB,, | Performed by: PHYSICIAN ASSISTANT

## 2023-06-12 PROCEDURE — 3078F PR MOST RECENT DIASTOLIC BLOOD PRESSURE < 80 MM HG: ICD-10-PCS | Mod: CPTII,S$GLB,, | Performed by: PHYSICIAN ASSISTANT

## 2023-06-12 PROCEDURE — 3074F SYST BP LT 130 MM HG: CPT | Mod: CPTII,S$GLB,, | Performed by: PHYSICIAN ASSISTANT

## 2023-06-12 PROCEDURE — 3078F DIAST BP <80 MM HG: CPT | Mod: CPTII,S$GLB,, | Performed by: PHYSICIAN ASSISTANT

## 2023-06-12 PROCEDURE — 1159F PR MEDICATION LIST DOCUMENTED IN MEDICAL RECORD: ICD-10-PCS | Mod: CPTII,S$GLB,, | Performed by: PHYSICIAN ASSISTANT

## 2023-06-12 PROCEDURE — 3008F BODY MASS INDEX DOCD: CPT | Mod: CPTII,S$GLB,, | Performed by: PHYSICIAN ASSISTANT

## 2023-06-12 PROCEDURE — 1159F MED LIST DOCD IN RCRD: CPT | Mod: CPTII,S$GLB,, | Performed by: PHYSICIAN ASSISTANT

## 2023-06-12 PROCEDURE — 99395 PR PREVENTIVE VISIT,EST,18-39: ICD-10-PCS | Mod: S$GLB,,, | Performed by: PHYSICIAN ASSISTANT

## 2023-06-12 NOTE — PROGRESS NOTES
Subjective:      Patient ID: Vashti Mccormack is a 37 y.o. female.    Chief Complaint: Annual Exam (Patient is here for her annual visit and is having ringing in both ears, and is also having right arm pain )      HPI  Pt is here today for wellness needs and new complaints    Difficulty losing weight , pt reports she has had difficulty losing weight for the past several years.  Will obtain labs    Occasional tinnitus - has had UTD hearing exam. Reports ringing in her ears only when she listens to loud music.  Pt counseled.     CPT syndrome - would like consult for surgery,  was recommended in the past by Dr. Snyder who has now retired.  Takes mobic and voltaren gel. Sleeping in wrist braces at night       Wellness:  Due for labs   Pap- 2022      Review of Systems   Constitutional:  Positive for unexpected weight change. Negative for activity change, appetite change, chills, fatigue and fever.   HENT:  Positive for tinnitus. Negative for nasal congestion, facial swelling and rhinorrhea.    Eyes:  Negative for pain and visual disturbance.   Respiratory:  Negative for cough, chest tightness and shortness of breath.    Cardiovascular:  Negative for chest pain, palpitations, leg swelling and claudication.   Gastrointestinal:  Negative for abdominal distention, abdominal pain, anal bleeding, blood in stool, constipation, diarrhea, nausea, vomiting and reflux.   Endocrine: Negative for polydipsia and polyuria.   Genitourinary:  Negative for difficulty urinating, flank pain and frequency.   Musculoskeletal:  Positive for arthralgias. Negative for back pain.   Neurological:  Positive for numbness. Negative for dizziness, weakness, light-headedness and headaches.   Psychiatric/Behavioral:  Negative for self-injury, sleep disturbance and suicidal ideas. The patient is not nervous/anxious.      Medication List with Changes/Refills   Current Medications    DICLOFENAC SODIUM (VOLTAREN) 1 % GEL    Apply 2 g topically 4 (four) times  "daily.    MELOXICAM (MOBIC) 15 MG TABLET    Take 1 tablet (15 mg total) by mouth once daily.   Discontinued Medications    PREDNISONE (DELTASONE) 20 MG TABLET    Take 3 tablets daily x 3 days, two tablets daily x 3 days , one tablet daily x 3 days        Objective:     Vitals:    06/12/23 0927   BP: 117/65   Pulse: 87   SpO2: 97%   Weight: 91.2 kg (200 lb 15.7 oz)   Height: 5' 3" (1.6 m)        Physical Exam  Constitutional:       Appearance: Normal appearance. She is normal weight.   HENT:      Head: Normocephalic and atraumatic.      Right Ear: Tympanic membrane, ear canal and external ear normal. There is no impacted cerumen.      Left Ear: Tympanic membrane, ear canal and external ear normal. There is no impacted cerumen.      Nose: No congestion or rhinorrhea.      Mouth/Throat:      Mouth: Mucous membranes are moist.      Pharynx: No oropharyngeal exudate or posterior oropharyngeal erythema.   Eyes:      General: No scleral icterus.        Right eye: No discharge.         Left eye: No discharge.      Conjunctiva/sclera: Conjunctivae normal.   Cardiovascular:      Rate and Rhythm: Normal rate and regular rhythm.      Pulses: Normal pulses.      Heart sounds: Normal heart sounds. No murmur heard.    No friction rub. No gallop. No S3 or S4 sounds.   Pulmonary:      Effort: Pulmonary effort is normal. No respiratory distress.      Breath sounds: Normal breath sounds. No wheezing, rhonchi or rales.   Abdominal:      General: Abdomen is flat. Bowel sounds are normal.      Palpations: Abdomen is soft.   Musculoskeletal:      Cervical back: Normal range of motion. No rigidity or tenderness.      Right lower leg: No edema.      Left lower leg: No edema.      Comments: Moves all extremities well and with good control    Skin:     Capillary Refill: Capillary refill takes less than 2 seconds.      Findings: No rash.   Neurological:      Mental Status: She is alert and oriented to person, place, and time.      Motor: No " weakness.      Gait: Gait normal.   Psychiatric:         Mood and Affect: Mood normal.         Behavior: Behavior normal.         Judgment: Judgment normal.          Assessment & Plan:     Obesity, unspecified classification, unspecified obesity type, unspecified whether serious comorbidity present  Comments:  counseled, labs pending     Preventative health care  -     CBC Auto Differential; Future; Expected date: 06/12/2023  -     Comprehensive Metabolic Panel; Future; Expected date: 06/12/2023  -     Hemoglobin A1C; Future; Expected date: 06/12/2023  -     Lipid Panel; Future; Expected date: 06/12/2023  -     TSH w/reflex to FT4; Future; Expected date: 06/12/2023  -     Urinalysis, Reflex to Urine Culture Urine, Clean Catch; Future; Expected date: 06/13/2023    Carpal tunnel syndrome on both sides  -     Ambulatory referral/consult to Orthopedics; Future; Expected date: 06/19/2023    Tinnitus, unspecified laterality  Comments:  f/u with ENT            1 mo followup       -Patient instructed that our office calls back on all labs and imaging within 1 week of receiving the results. Patient instructed to reach out to our office if they have not heard from us so that we can request the results from the lab/imaging center           Micaela Li PA-C

## 2023-06-13 LAB
ABS NRBC COUNT: 0 X 10 3/UL (ref 0–0.01)
ABSOLUTE BASOPHIL: 0.03 X 10 3/UL (ref 0–0.22)
ABSOLUTE EOSINOPHIL: 0.06 X 10 3/UL (ref 0.04–0.54)
ABSOLUTE IMMATURE GRAN: 0.02 X 10 3/UL (ref 0–0.04)
ABSOLUTE LYMPHOCYTE: 1.65 X 10 3/UL (ref 0.86–4.75)
ABSOLUTE MONOCYTE: 0.46 X 10 3/UL (ref 0.22–1.08)
ALBUMIN SERPL-MCNC: 4.3 G/DL (ref 3.5–5.2)
ALBUMIN/GLOB SERPL ELPH: 1.6 {RATIO} (ref 1–2.7)
ALP ISOS SERPL LEV INH-CCNC: 73 U/L (ref 35–105)
ALT (SGPT): 11 U/L (ref 0–33)
AMORPH URATE CRY URNS QL MICRO: NEGATIVE
ANION GAP SERPL CALC-SCNC: 10 MMOL/L (ref 8–17)
AST SERPL-CCNC: 14 U/L (ref 0–32)
BACTERIA #/AREA URNS HPF: ABNORMAL /[HPF]
BASOPHILS NFR BLD: 0.5 % (ref 0.2–1.2)
BILIRUB UR QL STRIP: NEGATIVE
BILIRUBIN, TOTAL: 0.28 MG/DL (ref 0–1.2)
BUN/CREAT SERPL: 17.6 (ref 6–20)
CALCIUM SERPL-MCNC: 9.5 MG/DL (ref 8.6–10.2)
CARBON DIOXIDE, CO2: 25 MMOL/L (ref 22–29)
CHLORIDE: 105 MMOL/L (ref 98–107)
CHOLEST SERPL-MSCNC: 212 MG/DL (ref 100–200)
CLARITY UR: ABNORMAL
COLOR UR: YELLOW
CREAT SERPL-MCNC: 0.7 MG/DL (ref 0.5–0.9)
EOSINOPHIL NFR BLD: 1 % (ref 0.7–7)
EPITHELIAL CELLS: ABNORMAL
ESTIMATED AVERAGE GLUCOSE: 109 MG/DL
GFR ESTIMATION: 115.59 ML/MIN/1.73M2
GLOBULIN: 2.7 G/DL (ref 1.5–4.5)
GLUCOSE (UA): NEGATIVE MG/DL
GLUCOSE: 100 MG/DL (ref 74–106)
HBA1C MFR BLD: 5.4 % (ref 4–6)
HCT VFR BLD AUTO: 40.8 % (ref 37–47)
HDLC SERPL-MCNC: 53 MG/DL
HGB BLD-MCNC: 13.1 G/DL (ref 12–16)
IMMATURE GRANULOCYTES: 0.3 % (ref 0–0.5)
KETONES UR QL STRIP: NEGATIVE MG/DL
LDL/HDL RATIO: 2.6 (ref 1–3)
LDLC SERPL CALC-MCNC: 140.2 MG/DL (ref 0–100)
LEUKOCYTE ESTERASE UR QL STRIP: ABNORMAL
LYMPHOCYTES NFR BLD: 26.5 % (ref 19.3–53.1)
MCH RBC QN AUTO: 29.6 PG (ref 27–32)
MCHC RBC AUTO-ENTMCNC: 32.1 G/DL (ref 32–36)
MCV RBC AUTO: 92.1 FL (ref 82–100)
MONOCYTES NFR BLD: 7.4 % (ref 4.7–12.5)
MUCOUS THREADS URNS QL MICRO: ABNORMAL
NEUTROPHILS # BLD AUTO: 4 X 10 3/UL (ref 2.15–7.56)
NEUTROPHILS NFR BLD: 64.3 % (ref 34–71.1)
NITRITE UR QL STRIP: NEGATIVE
NUCLEATED RED BLOOD CELLS: 0 /100 WBC (ref 0–0.2)
OCCULT BLOOD: ABNORMAL
PH, URINE: 7 (ref 5–7.5)
PLATELET # BLD AUTO: 284 X 10 3/UL (ref 135–400)
POTASSIUM: 5.1 MMOL/L (ref 3.5–5.1)
PROT SNV-MCNC: 7 G/DL (ref 6.4–8.3)
PROT UR QL STRIP: NEGATIVE MG/DL
RBC # BLD AUTO: 4.43 X 10 6/UL (ref 4.2–5.4)
RBC/HPF: ABNORMAL
RDW-SD: 45.3 FL (ref 37–54)
SODIUM: 140 MMOL/L (ref 136–145)
SP GR UR STRIP: 1.01 (ref 1–1.03)
TRIGL SERPL-MCNC: 94 MG/DL (ref 0–150)
TSH W/REFLEX TO FT4: 1.87 UIU/ML (ref 0.27–4.2)
UREA NITROGEN (BUN): 12.3 MG/DL (ref 6–20)
UROBILINOGEN, URINE: NORMAL E.U./DL (ref 0–1)
WBC # BLD: 6.22 X 10 3/UL (ref 4.3–10.8)
WBC/HPF: ABNORMAL

## 2023-07-12 ENCOUNTER — OFFICE VISIT (OUTPATIENT)
Dept: FAMILY MEDICINE | Facility: CLINIC | Age: 38
End: 2023-07-12
Payer: MEDICAID

## 2023-07-12 VITALS
SYSTOLIC BLOOD PRESSURE: 114 MMHG | BODY MASS INDEX: 35.08 KG/M2 | WEIGHT: 198 LBS | DIASTOLIC BLOOD PRESSURE: 58 MMHG | HEIGHT: 63 IN | HEART RATE: 74 BPM | OXYGEN SATURATION: 99 % | RESPIRATION RATE: 18 BRPM

## 2023-07-12 DIAGNOSIS — E78.5 HYPERLIPIDEMIA, UNSPECIFIED HYPERLIPIDEMIA TYPE: ICD-10-CM

## 2023-07-12 DIAGNOSIS — R73.03 PREDIABETES: ICD-10-CM

## 2023-07-12 DIAGNOSIS — H65.191 ACUTE MEE (MIDDLE EAR EFFUSION), RIGHT: ICD-10-CM

## 2023-07-12 DIAGNOSIS — E88.819 INSULIN RESISTANCE: Primary | ICD-10-CM

## 2023-07-12 PROCEDURE — 3044F PR MOST RECENT HEMOGLOBIN A1C LEVEL <7.0%: ICD-10-PCS | Mod: CPTII,S$GLB,, | Performed by: PHYSICIAN ASSISTANT

## 2023-07-12 PROCEDURE — 1159F MED LIST DOCD IN RCRD: CPT | Mod: CPTII,S$GLB,, | Performed by: PHYSICIAN ASSISTANT

## 2023-07-12 PROCEDURE — 3078F DIAST BP <80 MM HG: CPT | Mod: CPTII,S$GLB,, | Performed by: PHYSICIAN ASSISTANT

## 2023-07-12 PROCEDURE — 99213 PR OFFICE/OUTPT VISIT, EST, LEVL III, 20-29 MIN: ICD-10-PCS | Mod: S$GLB,,, | Performed by: PHYSICIAN ASSISTANT

## 2023-07-12 PROCEDURE — 3074F PR MOST RECENT SYSTOLIC BLOOD PRESSURE < 130 MM HG: ICD-10-PCS | Mod: CPTII,S$GLB,, | Performed by: PHYSICIAN ASSISTANT

## 2023-07-12 PROCEDURE — 3044F HG A1C LEVEL LT 7.0%: CPT | Mod: CPTII,S$GLB,, | Performed by: PHYSICIAN ASSISTANT

## 2023-07-12 PROCEDURE — 3074F SYST BP LT 130 MM HG: CPT | Mod: CPTII,S$GLB,, | Performed by: PHYSICIAN ASSISTANT

## 2023-07-12 PROCEDURE — 3008F BODY MASS INDEX DOCD: CPT | Mod: CPTII,S$GLB,, | Performed by: PHYSICIAN ASSISTANT

## 2023-07-12 PROCEDURE — 99213 OFFICE O/P EST LOW 20 MIN: CPT | Mod: S$GLB,,, | Performed by: PHYSICIAN ASSISTANT

## 2023-07-12 PROCEDURE — 3008F PR BODY MASS INDEX (BMI) DOCUMENTED: ICD-10-PCS | Mod: CPTII,S$GLB,, | Performed by: PHYSICIAN ASSISTANT

## 2023-07-12 PROCEDURE — 1159F PR MEDICATION LIST DOCUMENTED IN MEDICAL RECORD: ICD-10-PCS | Mod: CPTII,S$GLB,, | Performed by: PHYSICIAN ASSISTANT

## 2023-07-12 PROCEDURE — 3078F PR MOST RECENT DIASTOLIC BLOOD PRESSURE < 80 MM HG: ICD-10-PCS | Mod: CPTII,S$GLB,, | Performed by: PHYSICIAN ASSISTANT

## 2023-07-12 RX ORDER — ONDANSETRON 8 MG/1
8 TABLET, ORALLY DISINTEGRATING ORAL EVERY 8 HOURS PRN
Qty: 12 TABLET | Refills: 0 | Status: SHIPPED | OUTPATIENT
Start: 2023-07-12

## 2023-07-12 RX ORDER — SEMAGLUTIDE 0.68 MG/ML
INJECTION, SOLUTION SUBCUTANEOUS
Qty: 1 EACH | Refills: 1 | Status: SHIPPED | OUTPATIENT
Start: 2023-07-12 | End: 2023-09-26

## 2023-07-12 NOTE — PROGRESS NOTES
Subjective:      Patient ID: Vashti Mccormack is a 37 y.o. female.    Chief Complaint: Follow-up (Blood results)      HPI  Pt is here today for followup and lab review     Discussed labs with pt  HLD  Borderline a1c    2nd complaint - right ear pressure   Review of Systems   Constitutional:  Negative for activity change, appetite change, chills, diaphoresis, fatigue, fever and unexpected weight change.   HENT:  Positive for ear pain. Negative for nasal congestion, postnasal drip, rhinorrhea, sore throat, trouble swallowing and voice change.    Eyes:  Negative for pain, redness and visual disturbance.   Respiratory:  Negative for cough, chest tightness, shortness of breath and wheezing.    Cardiovascular:  Negative for chest pain, palpitations and leg swelling.   Gastrointestinal:  Negative for abdominal pain, constipation, diarrhea, nausea and vomiting.   Genitourinary:  Negative for dysuria and hematuria.   Musculoskeletal:  Negative for arthralgias, back pain, neck pain and neck stiffness.   Integumentary:  Negative for rash.   Neurological:  Negative for dizziness, vertigo, tremors, syncope, weakness, light-headedness, numbness and headaches.   Hematological:  Negative for adenopathy.   Psychiatric/Behavioral:  Negative for agitation, behavioral problems, confusion, decreased concentration, dysphoric mood, hallucinations, self-injury, sleep disturbance and suicidal ideas. The patient is not nervous/anxious and is not hyperactive.      Medication List with Changes/Refills   New Medications    ONDANSETRON (ZOFRAN-ODT) 8 MG TBDL    Take 1 tablet (8 mg total) by mouth every 8 (eight) hours as needed (nausea).    SEMAGLUTIDE (OZEMPIC) 0.25 MG OR 0.5 MG (2 MG/3 ML) PEN INJECTOR    .25 ml q 7 days for 2 weeks then increase to .5ml q 7 days thereafter   Current Medications    DICLOFENAC SODIUM (VOLTAREN) 1 % GEL    Apply 2 g topically 4 (four) times daily.    MELOXICAM (MOBIC) 15 MG TABLET    Take 1 tablet (15 mg total) by  "mouth once daily.        Objective:     Vitals:    07/12/23 0927   BP: (!) 114/58   Pulse: 74   Resp: 18   SpO2: 99%   Weight: 89.8 kg (198 lb)   Height: 5' 3" (1.6 m)        Physical Exam  Constitutional:       Appearance: Normal appearance. She is normal weight.   HENT:      Head: Normocephalic and atraumatic.      Right Ear: Hearing, ear canal and external ear normal. A middle ear effusion is present.      Nose: Nose normal.   Eyes:      Conjunctiva/sclera: Conjunctivae normal.      Comments: Eyes tracking normal on exam    Cardiovascular:      Rate and Rhythm: Normal rate and regular rhythm.      Pulses: Normal pulses.      Heart sounds: Normal heart sounds. No murmur heard.    No friction rub. No gallop.   Pulmonary:      Effort: Pulmonary effort is normal. No respiratory distress.      Breath sounds: Normal breath sounds. No stridor. No wheezing, rhonchi or rales.   Musculoskeletal:      Right lower leg: No edema.      Left lower leg: No edema.      Comments: Moves all extremities well and with good control  Normal gait observed      Skin:     General: Skin is warm and dry.      Capillary Refill: Capillary refill takes less than 2 seconds.   Neurological:      Mental Status: She is alert and oriented to person, place, and time.   Psychiatric:         Mood and Affect: Mood normal.         Behavior: Behavior normal.         Thought Content: Thought content normal.         Judgment: Judgment normal.          Assessment & Plan:     Insulin resistance  -     semaglutide (OZEMPIC) 0.25 mg or 0.5 mg (2 mg/3 mL) pen injector; .25 ml q 7 days for 2 weeks then increase to .5ml q 7 days thereafter  Dispense: 1 each; Refill: 1  -     ondansetron (ZOFRAN-ODT) 8 MG TbDL; Take 1 tablet (8 mg total) by mouth every 8 (eight) hours as needed (nausea).  Dispense: 12 tablet; Refill: 0    Hyperlipidemia, unspecified hyperlipidemia type  Comments:  diet and exercise     Prediabetes  Comments:  diet and exercise     Acute ASHUTOSH " (middle ear effusion), right  Comments:  otc sudafed and flonase            1 mo followup       -Patient instructed that our office calls back on all labs and imaging within 1 week of receiving the results. Patient instructed to reach out to our office if they have not heard from us so that we can request the results from the lab/imaging center           Micaela Li PA-C

## 2023-07-14 ENCOUNTER — PATIENT MESSAGE (OUTPATIENT)
Dept: FAMILY MEDICINE | Facility: CLINIC | Age: 38
End: 2023-07-14
Payer: MEDICAID

## 2023-07-26 ENCOUNTER — PATIENT MESSAGE (OUTPATIENT)
Dept: FAMILY MEDICINE | Facility: CLINIC | Age: 38
End: 2023-07-26
Payer: MEDICAID

## 2023-09-26 ENCOUNTER — OFFICE VISIT (OUTPATIENT)
Dept: FAMILY MEDICINE | Facility: CLINIC | Age: 38
End: 2023-09-26
Payer: MEDICAID

## 2023-09-26 VITALS
HEART RATE: 74 BPM | WEIGHT: 195 LBS | BODY MASS INDEX: 34.55 KG/M2 | HEIGHT: 63 IN | DIASTOLIC BLOOD PRESSURE: 58 MMHG | OXYGEN SATURATION: 97 % | SYSTOLIC BLOOD PRESSURE: 104 MMHG

## 2023-09-26 DIAGNOSIS — J30.9 ALLERGIC RHINITIS, UNSPECIFIED SEASONALITY, UNSPECIFIED TRIGGER: Primary | ICD-10-CM

## 2023-09-26 PROCEDURE — 3074F PR MOST RECENT SYSTOLIC BLOOD PRESSURE < 130 MM HG: ICD-10-PCS | Mod: CPTII,S$GLB,, | Performed by: NURSE PRACTITIONER

## 2023-09-26 PROCEDURE — 1160F PR REVIEW ALL MEDS BY PRESCRIBER/CLIN PHARMACIST DOCUMENTED: ICD-10-PCS | Mod: CPTII,S$GLB,, | Performed by: NURSE PRACTITIONER

## 2023-09-26 PROCEDURE — 3078F PR MOST RECENT DIASTOLIC BLOOD PRESSURE < 80 MM HG: ICD-10-PCS | Mod: CPTII,S$GLB,, | Performed by: NURSE PRACTITIONER

## 2023-09-26 PROCEDURE — 3044F PR MOST RECENT HEMOGLOBIN A1C LEVEL <7.0%: ICD-10-PCS | Mod: CPTII,S$GLB,, | Performed by: NURSE PRACTITIONER

## 2023-09-26 PROCEDURE — 3074F SYST BP LT 130 MM HG: CPT | Mod: CPTII,S$GLB,, | Performed by: NURSE PRACTITIONER

## 2023-09-26 PROCEDURE — 1159F MED LIST DOCD IN RCRD: CPT | Mod: CPTII,S$GLB,, | Performed by: NURSE PRACTITIONER

## 2023-09-26 PROCEDURE — 3044F HG A1C LEVEL LT 7.0%: CPT | Mod: CPTII,S$GLB,, | Performed by: NURSE PRACTITIONER

## 2023-09-26 PROCEDURE — 3078F DIAST BP <80 MM HG: CPT | Mod: CPTII,S$GLB,, | Performed by: NURSE PRACTITIONER

## 2023-09-26 PROCEDURE — 3008F PR BODY MASS INDEX (BMI) DOCUMENTED: ICD-10-PCS | Mod: CPTII,S$GLB,, | Performed by: NURSE PRACTITIONER

## 2023-09-26 PROCEDURE — 1159F PR MEDICATION LIST DOCUMENTED IN MEDICAL RECORD: ICD-10-PCS | Mod: CPTII,S$GLB,, | Performed by: NURSE PRACTITIONER

## 2023-09-26 PROCEDURE — 3008F BODY MASS INDEX DOCD: CPT | Mod: CPTII,S$GLB,, | Performed by: NURSE PRACTITIONER

## 2023-09-26 PROCEDURE — 1160F RVW MEDS BY RX/DR IN RCRD: CPT | Mod: CPTII,S$GLB,, | Performed by: NURSE PRACTITIONER

## 2023-09-26 PROCEDURE — 99213 OFFICE O/P EST LOW 20 MIN: CPT | Mod: S$GLB,,, | Performed by: NURSE PRACTITIONER

## 2023-09-26 PROCEDURE — 99213 PR OFFICE/OUTPT VISIT, EST, LEVL III, 20-29 MIN: ICD-10-PCS | Mod: S$GLB,,, | Performed by: NURSE PRACTITIONER

## 2023-09-26 RX ORDER — PHENTERMINE HYDROCHLORIDE 37.5 MG/1
37.5 TABLET ORAL DAILY
COMMUNITY
Start: 2023-08-24

## 2023-09-26 RX ORDER — FLUTICASONE PROPIONATE 50 MCG
1 SPRAY, SUSPENSION (ML) NASAL DAILY
Qty: 10 ML | Refills: 0 | Status: SHIPPED | OUTPATIENT
Start: 2023-09-26

## 2023-09-26 RX ORDER — METHYLPREDNISOLONE 4 MG/1
TABLET ORAL
Qty: 21 EACH | Refills: 0 | Status: SHIPPED | OUTPATIENT
Start: 2023-09-26 | End: 2023-10-17

## 2023-09-26 NOTE — PROGRESS NOTES
"Patient ID: Vashti Mccormack  MRN: [id number]      History of Present Illness:  The patient is a 37 y.o. White female who presents to clinic for evaluation and management with a chief complaint of Neck Pain (Ears pain)     Allergies: Patient has No Known Allergies.     Smoking status:  Social History     Tobacco Use   Smoking Status Never   Smokeless Tobacco Never       Problem List:  Patient Active Problem List   Diagnosis    S/P  section    Encounter for removal and reinsertion of Nexplanon    Insulin resistance    Prediabetes    Hyperlipidemia        Current Medications:  Current Outpatient Medications   Medication Instructions    diclofenac sodium (VOLTAREN) 2 g, Topical (Top), 4 times daily    fluticasone propionate (FLONASE) 50 mcg, Each Nostril, Daily    meloxicam (MOBIC) 15 mg, Oral, Daily    methylPREDNISolone (MEDROL DOSEPACK) 4 mg tablet use as directed    ondansetron (ZOFRAN-ODT) 8 mg, Oral, Every 8 hours PRN    phentermine (ADIPEX-P) 37.5 mg, Oral, Daily       Review of Systems   Constitutional: Negative.    HENT:  Positive for ear pain.         Feels  pressure in both ears and feels a swelling and soreness on the left side of neck   Eyes: Negative.    Respiratory: Negative.     Cardiovascular: Negative.    Gastrointestinal: Negative.    Endocrine: Negative.    Genitourinary: Negative.    Musculoskeletal: Negative.    Integumentary:  Negative.   Allergic/Immunologic: Negative.    Neurological: Negative.    Hematological: Negative.    Psychiatric/Behavioral: Negative.          Visit Vitals  BP (!) 104/58 (BP Location: Left arm, Patient Position: Sitting, BP Method: Large (Automatic))   Pulse 74   Ht 5' 3" (1.6 m)   Wt 88.5 kg (195 lb)   SpO2 97%   BMI 34.54 kg/m²       Physical Exam  Vitals and nursing note reviewed.   Constitutional:       Appearance: Normal appearance.   HENT:      Head: Normocephalic.      Nose:      Comments: Nasal passages boggy , clear drainage     Oropharynx + for " cobblestoning. , slight erythema     One slightly enlarged left anterior cervical lymph node palpated.      Mouth/Throat:      Mouth: Mucous membranes are moist.      Pharynx: Oropharynx is clear.   Eyes:      Conjunctiva/sclera: Conjunctivae normal.   Cardiovascular:      Rate and Rhythm: Normal rate and regular rhythm.   Pulmonary:      Effort: Pulmonary effort is normal.      Breath sounds: Normal breath sounds.   Abdominal:      General: Bowel sounds are normal.      Palpations: Abdomen is soft.   Musculoskeletal:         General: Normal range of motion.      Cervical back: Normal range of motion.   Skin:     General: Skin is warm and dry.   Neurological:      General: No focal deficit present.      Mental Status: She is alert.   Psychiatric:         Mood and Affect: Mood normal.         Behavior: Behavior normal.          Assessment & Plan:  1. Allergic rhinitis, unspecified seasonality, unspecified trigger  -     methylPREDNISolone (MEDROL DOSEPACK) 4 mg tablet; use as directed  Dispense: 21 each; Refill: 0  -     fluticasone propionate (FLONASE) 50 mcg/actuation nasal spray; 1 spray (50 mcg total) by Each Nostril route once daily.  Dispense: 10 mL; Refill: 0     Advised use of normal saline irrigation, use of flonase and an oral antihistamine to reduce symptoms.     Future Appointments   Date Time Provider Department Center   1/2/2024  1:15 PM Jaya Gan MD [id number]       Lab Frequency Next Occurrence   Ambulatory referral/consult to Orthopedics Once 03/15/2023   Ambulatory referral/consult to Orthopedics Once 06/19/2023       Follow up if symptoms worsen or fail to improve.      Jacki Sue NP

## 2023-11-06 ENCOUNTER — OFFICE VISIT (OUTPATIENT)
Dept: FAMILY MEDICINE | Facility: CLINIC | Age: 38
End: 2023-11-06
Payer: MEDICAID

## 2023-11-06 VITALS
BODY MASS INDEX: 34.91 KG/M2 | HEIGHT: 63 IN | HEART RATE: 80 BPM | WEIGHT: 197 LBS | OXYGEN SATURATION: 98 % | DIASTOLIC BLOOD PRESSURE: 82 MMHG | SYSTOLIC BLOOD PRESSURE: 138 MMHG

## 2023-11-06 DIAGNOSIS — M54.2 NECK PAIN: Primary | ICD-10-CM

## 2023-11-06 PROCEDURE — 3079F DIAST BP 80-89 MM HG: CPT | Mod: CPTII,S$GLB,, | Performed by: PHYSICIAN ASSISTANT

## 2023-11-06 PROCEDURE — 3044F PR MOST RECENT HEMOGLOBIN A1C LEVEL <7.0%: ICD-10-PCS | Mod: CPTII,S$GLB,, | Performed by: PHYSICIAN ASSISTANT

## 2023-11-06 PROCEDURE — 1159F MED LIST DOCD IN RCRD: CPT | Mod: CPTII,S$GLB,, | Performed by: PHYSICIAN ASSISTANT

## 2023-11-06 PROCEDURE — 1159F PR MEDICATION LIST DOCUMENTED IN MEDICAL RECORD: ICD-10-PCS | Mod: CPTII,S$GLB,, | Performed by: PHYSICIAN ASSISTANT

## 2023-11-06 PROCEDURE — 99213 PR OFFICE/OUTPT VISIT, EST, LEVL III, 20-29 MIN: ICD-10-PCS | Mod: S$GLB,,, | Performed by: PHYSICIAN ASSISTANT

## 2023-11-06 PROCEDURE — 3075F PR MOST RECENT SYSTOLIC BLOOD PRESS GE 130-139MM HG: ICD-10-PCS | Mod: CPTII,S$GLB,, | Performed by: PHYSICIAN ASSISTANT

## 2023-11-06 PROCEDURE — 3008F PR BODY MASS INDEX (BMI) DOCUMENTED: ICD-10-PCS | Mod: CPTII,S$GLB,, | Performed by: PHYSICIAN ASSISTANT

## 2023-11-06 PROCEDURE — 3075F SYST BP GE 130 - 139MM HG: CPT | Mod: CPTII,S$GLB,, | Performed by: PHYSICIAN ASSISTANT

## 2023-11-06 PROCEDURE — 3079F PR MOST RECENT DIASTOLIC BLOOD PRESSURE 80-89 MM HG: ICD-10-PCS | Mod: CPTII,S$GLB,, | Performed by: PHYSICIAN ASSISTANT

## 2023-11-06 PROCEDURE — 3044F HG A1C LEVEL LT 7.0%: CPT | Mod: CPTII,S$GLB,, | Performed by: PHYSICIAN ASSISTANT

## 2023-11-06 PROCEDURE — 99213 OFFICE O/P EST LOW 20 MIN: CPT | Mod: S$GLB,,, | Performed by: PHYSICIAN ASSISTANT

## 2023-11-06 PROCEDURE — 3008F BODY MASS INDEX DOCD: CPT | Mod: CPTII,S$GLB,, | Performed by: PHYSICIAN ASSISTANT

## 2023-11-06 NOTE — PROGRESS NOTES
Subjective:      Patient ID: Vashti Mccormack is a 38 y.o. female.    Chief Complaint: Other (Pain to left side of neck and ear/ pain started on Saturday, tool ibuprofen for the pain)      Otalgia   There is pain in the left ear. This is a recurrent problem. The current episode started more than 1 month ago. The problem occurs constantly. The problem has been waxing and waning. There has been no fever. The pain is at a severity of 4/10. The pain is moderate. Associated symptoms include drainage and neck pain. Pertinent negatives include no abdominal pain, coughing, diarrhea, ear discharge, headaches, hearing loss, rash, rhinorrhea, sore throat or vomiting. She has tried NSAIDs and acetaminophen for the symptoms. The treatment provided moderate relief. Her past medical history is significant for a chronic ear infection. There is no history of hearing loss or a tympanostomy tube.     Pt is here tdoay with a cc of left sided neck pain x 3 days,  improving each day with NSAIDs .  No injury . No ST.     No NT UE, no arm pain     Review of Systems   HENT:  Negative for ear discharge, ear pain, hearing loss, rhinorrhea and sore throat.    Respiratory:  Negative for cough.    Gastrointestinal:  Negative for abdominal pain, diarrhea and vomiting.   Musculoskeletal:  Positive for neck pain.   Integumentary:  Negative for rash.   Neurological:  Negative for headaches.       Medication List with Changes/Refills   Current Medications    DICLOFENAC SODIUM (VOLTAREN) 1 % GEL    Apply 2 g topically 4 (four) times daily.    FLUTICASONE PROPIONATE (FLONASE) 50 MCG/ACTUATION NASAL SPRAY    1 spray (50 mcg total) by Each Nostril route once daily.    MELOXICAM (MOBIC) 15 MG TABLET    Take 1 tablet (15 mg total) by mouth once daily.    ONDANSETRON (ZOFRAN-ODT) 8 MG TBDL    Take 1 tablet (8 mg total) by mouth every 8 (eight) hours as needed (nausea).    PHENTERMINE (ADIPEX-P) 37.5 MG TABLET    Take 37.5 mg by mouth once daily.     "    Objective:     Vitals:    11/06/23 1029   BP: 138/82   Pulse: 80   SpO2: 98%   Weight: 89.4 kg (197 lb)   Height: 5' 3" (1.6 m)        Physical Exam  Constitutional:       General: She is not in acute distress.     Appearance: She is not ill-appearing, toxic-appearing or diaphoretic.   HENT:      Head: Normocephalic.      Right Ear: Tympanic membrane, ear canal and external ear normal. No mastoid tenderness.      Left Ear: Tympanic membrane, ear canal and external ear normal. No mastoid tenderness.      Nose: Nose normal.      Mouth/Throat:      Mouth: Mucous membranes are moist.      Pharynx: Oropharynx is clear. No oropharyngeal exudate.   Eyes:      General: No scleral icterus.        Right eye: No discharge.         Left eye: No discharge.      Conjunctiva/sclera: Conjunctivae normal.   Neck:        Comments: Mildly TTP as marked in image  No palpable lymphadenopathy  FROM of cspine without pain   Cardiovascular:      Rate and Rhythm: Normal rate and regular rhythm.      Pulses: Normal pulses.      Heart sounds: Normal heart sounds. No murmur heard.     No friction rub. No gallop.   Pulmonary:      Effort: Pulmonary effort is normal. No respiratory distress.      Breath sounds: No wheezing, rhonchi or rales.   Musculoskeletal:         General: No swelling.      Cervical back: Normal range of motion. Tenderness present. No rigidity.      Right lower leg: No edema.      Left lower leg: No edema.      Comments: Moves all extremities well and with good control    Lymphadenopathy:      Cervical: No cervical adenopathy.   Skin:     General: Skin is warm and dry.   Neurological:      Mental Status: She is alert and oriented to person, place, and time.   Psychiatric:         Mood and Affect: Mood normal.         Behavior: Behavior normal.            Assessment & Plan:     Neck pain  Comments:  likely strain and self limting, improving daily with conservative care per pt.  She will monitor and reach out to me if not " resolved in 1 week.  Continue NSAID             -Patient instructed that our office calls back on all labs and imaging within 1 week of receiving the results. Patient instructed to reach out to our office if they have not heard from us so that we can request the results from the lab/imaging center           Micaela Li PA-C

## 2024-02-22 ENCOUNTER — OFFICE VISIT (OUTPATIENT)
Dept: OBSTETRICS AND GYNECOLOGY | Facility: CLINIC | Age: 39
End: 2024-02-22
Payer: MEDICAID

## 2024-02-22 VITALS
WEIGHT: 192 LBS | SYSTOLIC BLOOD PRESSURE: 116 MMHG | DIASTOLIC BLOOD PRESSURE: 79 MMHG | HEART RATE: 83 BPM | BODY MASS INDEX: 34.01 KG/M2

## 2024-02-22 DIAGNOSIS — Z01.419 WOMEN'S ANNUAL ROUTINE GYNECOLOGICAL EXAMINATION: Primary | ICD-10-CM

## 2024-02-22 PROCEDURE — 3074F SYST BP LT 130 MM HG: CPT | Mod: CPTII,S$GLB,, | Performed by: OBSTETRICS & GYNECOLOGY

## 2024-02-22 PROCEDURE — 3008F BODY MASS INDEX DOCD: CPT | Mod: CPTII,S$GLB,, | Performed by: OBSTETRICS & GYNECOLOGY

## 2024-02-22 PROCEDURE — 1159F MED LIST DOCD IN RCRD: CPT | Mod: CPTII,S$GLB,, | Performed by: OBSTETRICS & GYNECOLOGY

## 2024-02-22 PROCEDURE — 3078F DIAST BP <80 MM HG: CPT | Mod: CPTII,S$GLB,, | Performed by: OBSTETRICS & GYNECOLOGY

## 2024-02-22 PROCEDURE — 99395 PREV VISIT EST AGE 18-39: CPT | Mod: S$GLB,,, | Performed by: OBSTETRICS & GYNECOLOGY

## 2024-02-22 NOTE — PROGRESS NOTES
Subjective:       Patient ID: Vashti Mccormack is a 38 y.o. female.    Chief Complaint:  Well Woman      History of Present Illness  Pt here for gyn annual.  History and past labs reviewed with patient.    Complaints none       Review of Systems  Review of Systems   Constitutional:  Negative for chills and fever.   Respiratory:  Negative for shortness of breath.    Cardiovascular:  Negative for chest pain.   Gastrointestinal:  Negative for abdominal pain, blood in stool, constipation, diarrhea, nausea, vomiting and reflux.   Genitourinary:  Negative for dysmenorrhea, dyspareunia, dysuria, hematuria, hot flashes, menorrhagia, menstrual problem, pelvic pain, vaginal bleeding, vaginal discharge, postcoital bleeding and vaginal dryness.   Musculoskeletal:  Negative for arthralgias and joint swelling.   Integumentary:  Negative for rash, hair changes, breast mass, nipple discharge and breast skin changes.   Psychiatric/Behavioral:  Negative for depression. The patient is not nervous/anxious.    Breast: Negative for asymmetry, lump, mass, nipple discharge and skin changes          Objective:     Vitals:    02/22/24 0804   BP: 116/79   Pulse: 83   Weight: 87.1 kg (192 lb)       Physical Exam:   Constitutional: She appears well-developed and well-nourished. No distress.    HENT:   Head: Normocephalic and atraumatic.    Eyes: Conjunctivae and EOM are normal.    Neck: No tracheal deviation present. No thyromegaly present.    Cardiovascular:       Exam reveals no clubbing, no cyanosis and no edema.        Pulmonary/Chest: Effort normal. No respiratory distress.        Abdominal: Soft. She exhibits no distension and no mass. There is no abdominal tenderness. There is no rebound and no guarding. No hernia.     Genitourinary:    Vagina, uterus and rectum normal.      Pelvic exam was performed with patient supine.   There is no rash, tenderness, lesion or injury on the right labia. There is no rash, tenderness, lesion or injury  on the left labia. Cervix is normal. Right adnexum displays no mass, no tenderness and no fullness. Left adnexum displays no mass, no tenderness and no fullness.                Skin: She is not diaphoretic. No cyanosis. Nails show no clubbing.        breast exam wnl- no nipple dc, skin changes, masses or lymph nodes palpated bilaterally    Assessment:        No diagnosis found.                Plan:      Annual   Pap UTD   STD panel declined   Contraception - nexplanon 2022  Risk assessment for inherited gyn cancer done - neg   RTC  1 year

## 2024-11-13 ENCOUNTER — PATIENT OUTREACH (OUTPATIENT)
Dept: ADMINISTRATIVE | Facility: HOSPITAL | Age: 39
End: 2024-11-13
Payer: MEDICAID

## 2025-03-10 ENCOUNTER — TELEPHONE (OUTPATIENT)
Dept: OBSTETRICS AND GYNECOLOGY | Facility: CLINIC | Age: 40
End: 2025-03-10
Payer: MEDICAID

## 2025-03-10 ENCOUNTER — PATIENT MESSAGE (OUTPATIENT)
Dept: OBSTETRICS AND GYNECOLOGY | Facility: CLINIC | Age: 40
End: 2025-03-10
Payer: MEDICAID

## 2025-03-10 NOTE — TELEPHONE ENCOUNTER
----- Message from DeCinnamonalvin sent at 3/10/2025  9:59 AM CDT -----  Contact: self [phone]  Type:  Needs Medical AdviceWho Called: Leeanne (please be specific): appt reschedule Would the patient rather a call back or a response via [facility]? Call back Best Call Back Number: [phone] Information:

## 2025-04-14 ENCOUNTER — PATIENT MESSAGE (OUTPATIENT)
Dept: OBSTETRICS AND GYNECOLOGY | Facility: CLINIC | Age: 40
End: 2025-04-14
Payer: COMMERCIAL